# Patient Record
Sex: MALE | Race: WHITE | NOT HISPANIC OR LATINO
[De-identification: names, ages, dates, MRNs, and addresses within clinical notes are randomized per-mention and may not be internally consistent; named-entity substitution may affect disease eponyms.]

---

## 2017-11-22 ENCOUNTER — APPOINTMENT (OUTPATIENT)
Dept: FAMILY MEDICINE | Facility: CLINIC | Age: 67
End: 2017-11-22
Payer: MEDICARE

## 2017-11-22 VITALS
DIASTOLIC BLOOD PRESSURE: 84 MMHG | SYSTOLIC BLOOD PRESSURE: 112 MMHG | WEIGHT: 225 LBS | HEART RATE: 87 BPM | RESPIRATION RATE: 16 BRPM | OXYGEN SATURATION: 96 % | HEIGHT: 69 IN | BODY MASS INDEX: 33.33 KG/M2

## 2017-11-22 DIAGNOSIS — Z78.9 OTHER SPECIFIED HEALTH STATUS: ICD-10-CM

## 2017-11-22 DIAGNOSIS — Z82.49 FAMILY HISTORY OF ISCHEMIC HEART DISEASE AND OTHER DISEASES OF THE CIRCULATORY SYSTEM: ICD-10-CM

## 2017-11-22 PROCEDURE — 99213 OFFICE O/P EST LOW 20 MIN: CPT

## 2017-11-22 RX ORDER — BENZONATATE 100 MG/1
100 CAPSULE ORAL
Qty: 180 | Refills: 0 | Status: DISCONTINUED | COMMUNITY
Start: 2017-10-20 | End: 2017-11-22

## 2017-11-22 RX ORDER — OLOPATADINE HCL 1 MG/ML
0.1 SOLUTION/ DROPS OPHTHALMIC
Qty: 5 | Refills: 0 | Status: DISCONTINUED | COMMUNITY
Start: 2017-05-26 | End: 2017-11-22

## 2017-11-22 RX ORDER — CEFDINIR 300 MG/1
300 CAPSULE ORAL
Qty: 20 | Refills: 0 | Status: DISCONTINUED | COMMUNITY
Start: 2017-07-31 | End: 2017-11-22

## 2017-11-22 RX ORDER — OMEPRAZOLE 20 MG/1
20 CAPSULE, DELAYED RELEASE ORAL
Qty: 90 | Refills: 0 | Status: DISCONTINUED | COMMUNITY
Start: 2017-09-19 | End: 2017-11-22

## 2017-11-22 RX ORDER — PREDNISONE 5 MG/1
5 TABLET ORAL
Qty: 240 | Refills: 0 | Status: DISCONTINUED | COMMUNITY
Start: 2017-10-03 | End: 2017-11-22

## 2017-11-22 RX ORDER — LEVOFLOXACIN 500 MG/1
500 TABLET, FILM COATED ORAL
Qty: 10 | Refills: 0 | Status: DISCONTINUED | COMMUNITY
Start: 2017-08-29 | End: 2017-11-22

## 2018-01-11 ENCOUNTER — RX RENEWAL (OUTPATIENT)
Age: 68
End: 2018-01-11

## 2018-02-14 ENCOUNTER — APPOINTMENT (OUTPATIENT)
Dept: FAMILY MEDICINE | Facility: CLINIC | Age: 68
End: 2018-02-14
Payer: MEDICARE

## 2018-02-14 VITALS
WEIGHT: 225 LBS | BODY MASS INDEX: 33.33 KG/M2 | SYSTOLIC BLOOD PRESSURE: 122 MMHG | OXYGEN SATURATION: 96 % | HEIGHT: 69 IN | DIASTOLIC BLOOD PRESSURE: 64 MMHG | RESPIRATION RATE: 16 BRPM | HEART RATE: 70 BPM

## 2018-02-14 DIAGNOSIS — M17.10 UNILATERAL PRIMARY OSTEOARTHRITIS, UNSPECIFIED KNEE: ICD-10-CM

## 2018-02-14 PROCEDURE — 99213 OFFICE O/P EST LOW 20 MIN: CPT

## 2018-03-26 ENCOUNTER — MEDICATION RENEWAL (OUTPATIENT)
Age: 68
End: 2018-03-26

## 2018-04-11 ENCOUNTER — RX RENEWAL (OUTPATIENT)
Age: 68
End: 2018-04-11

## 2018-06-20 ENCOUNTER — RX RENEWAL (OUTPATIENT)
Age: 68
End: 2018-06-20

## 2018-06-20 DIAGNOSIS — K21.9 GASTRO-ESOPHAGEAL REFLUX DISEASE W/OUT ESOPHAGITIS: ICD-10-CM

## 2018-07-20 ENCOUNTER — RX RENEWAL (OUTPATIENT)
Age: 68
End: 2018-07-20

## 2018-07-30 ENCOUNTER — APPOINTMENT (OUTPATIENT)
Dept: FAMILY MEDICINE | Facility: CLINIC | Age: 68
End: 2018-07-30
Payer: MEDICARE

## 2018-07-30 VITALS
SYSTOLIC BLOOD PRESSURE: 140 MMHG | OXYGEN SATURATION: 90 % | RESPIRATION RATE: 16 BRPM | HEART RATE: 104 BPM | TEMPERATURE: 98.7 F | DIASTOLIC BLOOD PRESSURE: 90 MMHG

## 2018-07-30 PROCEDURE — 99213 OFFICE O/P EST LOW 20 MIN: CPT

## 2018-07-30 NOTE — REVIEW OF SYSTEMS
[Fever] : fever [Shortness Of Breath] : shortness of breath [Cough] : cough [Dyspnea on Exertion] : dyspnea on exertion

## 2018-07-30 NOTE — ASSESSMENT
[FreeTextEntry1] : Long discussion with pt and his wife that he needs to go to the ER - he prefers [facility]. States he will go.

## 2018-07-30 NOTE — HISTORY OF PRESENT ILLNESS
[FreeTextEntry8] : pt c/o temp +cough +chills +body aches +ha -sinus pressure +congestion x 2 days  Has SOB.

## 2018-07-30 NOTE — PHYSICAL EXAM
[Ill-Appearing] : ill-appearing [Normal Outer Ear/Nose] : the outer ears and nose were normal in appearance [Normal Oropharynx] : the oropharynx was normal [Normal TMs] : both tympanic membranes were normal [No JVD] : no jugular venous distention [Supple] : supple [No Lymphadenopathy] : no lymphadenopathy [Regular Rhythm] : with a regular rhythm [No Carotid Bruits] : no carotid bruits [Normal Posterior Cervical Nodes] : no posterior cervical lymphadenopathy [Normal Anterior Cervical Nodes] : no anterior cervical lymphadenopathy [de-identified] : in mild/moderate respiratory distress [de-identified] : diffuse rhonchi in all lung fields, fair to poor ventilation [de-identified] : HR-104/min

## 2018-09-06 ENCOUNTER — APPOINTMENT (OUTPATIENT)
Dept: FAMILY MEDICINE | Facility: CLINIC | Age: 68
End: 2018-09-06
Payer: MEDICARE

## 2018-09-06 VITALS
HEART RATE: 64 BPM | WEIGHT: 225 LBS | BODY MASS INDEX: 33.23 KG/M2 | DIASTOLIC BLOOD PRESSURE: 62 MMHG | OXYGEN SATURATION: 92 % | SYSTOLIC BLOOD PRESSURE: 110 MMHG | RESPIRATION RATE: 16 BRPM

## 2018-09-06 DIAGNOSIS — R53.83 OTHER FATIGUE: ICD-10-CM

## 2018-09-06 PROCEDURE — 99213 OFFICE O/P EST LOW 20 MIN: CPT | Mod: 25

## 2018-09-06 PROCEDURE — 90670 PCV13 VACCINE IM: CPT

## 2018-09-06 PROCEDURE — G0009: CPT

## 2018-09-06 RX ORDER — CLINDAMYCIN HYDROCHLORIDE 150 MG/1
150 CAPSULE ORAL
Qty: 21 | Refills: 0 | Status: DISCONTINUED | COMMUNITY
Start: 2018-07-26 | End: 2018-09-06

## 2018-09-06 RX ORDER — FLUCONAZOLE 200 MG/1
200 TABLET ORAL
Qty: 14 | Refills: 0 | Status: DISCONTINUED | COMMUNITY
Start: 2018-01-03 | End: 2018-09-06

## 2018-09-06 RX ORDER — HYDROCODONE BITARTRATE AND HOMATROPINE METHYLBROMIDE 5; 1.5 MG/5ML; MG/5ML
5-1.5 SYRUP ORAL
Qty: 210 | Refills: 0 | Status: DISCONTINUED | COMMUNITY
Start: 2017-11-27 | End: 2018-09-06

## 2018-09-06 RX ORDER — HYDROCODONE BITARTRATE AND ACETAMINOPHEN 5; 325 MG/1; MG/1
5-325 TABLET ORAL
Qty: 12 | Refills: 0 | Status: DISCONTINUED | COMMUNITY
Start: 2018-07-26 | End: 2018-09-06

## 2018-09-06 NOTE — REVIEW OF SYSTEMS
[Cough] : cough [Joint Pain] : joint pain [Joint Stiffness] : joint stiffness [Negative] : Heme/Lymph

## 2018-09-06 NOTE — HISTORY OF PRESENT ILLNESS
[FreeTextEntry1] : Pt is here for medication renewal.  [de-identified] : Pt here for Management of HTN, hyoperlipidemia and needs prevnar 13.

## 2018-09-06 NOTE — ASSESSMENT
[FreeTextEntry1] : 1. BP very well controlled- 110/62 and I renewed his quinapril 20 mg. Exercise reviewed. 2. Diet and medications use reviewed. As not fasting lab rx given. 3. Prevnar 13 given.

## 2018-10-07 LAB
CHOLEST SERPL-MCNC: 100
HBA1C MFR BLD HPLC: 6.1
HDLC SERPL-MCNC: 38
LDLC SERPL CALC-MCNC: 48
PSA SERPL-MCNC: 0.6
TRIGL SERPL-MCNC: 69

## 2018-10-09 ENCOUNTER — RX RENEWAL (OUTPATIENT)
Age: 68
End: 2018-10-09

## 2018-12-05 ENCOUNTER — APPOINTMENT (OUTPATIENT)
Dept: FAMILY MEDICINE | Facility: CLINIC | Age: 68
End: 2018-12-05
Payer: MEDICARE

## 2018-12-05 VITALS
HEIGHT: 69 IN | RESPIRATION RATE: 16 BRPM | WEIGHT: 225 LBS | OXYGEN SATURATION: 96 % | HEART RATE: 70 BPM | DIASTOLIC BLOOD PRESSURE: 68 MMHG | BODY MASS INDEX: 33.33 KG/M2 | SYSTOLIC BLOOD PRESSURE: 114 MMHG

## 2018-12-05 DIAGNOSIS — N52.9 MALE ERECTILE DYSFUNCTION, UNSPECIFIED: ICD-10-CM

## 2018-12-05 PROCEDURE — 99213 OFFICE O/P EST LOW 20 MIN: CPT | Mod: 25

## 2018-12-05 PROCEDURE — 90662 IIV NO PRSV INCREASED AG IM: CPT

## 2018-12-05 PROCEDURE — G0008: CPT

## 2018-12-05 RX ORDER — SILDENAFIL CITRATE 100 MG/1
100 TABLET, FILM COATED ORAL
Qty: 6 | Refills: 0 | Status: DISCONTINUED | COMMUNITY
Start: 2017-07-26 | End: 2018-12-05

## 2018-12-05 RX ORDER — PREDNISONE 20 MG/1
20 TABLET ORAL
Qty: 4 | Refills: 0 | Status: DISCONTINUED | COMMUNITY
Start: 2018-07-26 | End: 2018-12-05

## 2018-12-05 RX ORDER — PREDNISONE 10 MG/1
10 TABLET ORAL
Qty: 30 | Refills: 0 | Status: DISCONTINUED | COMMUNITY
Start: 2018-01-29 | End: 2018-12-05

## 2018-12-05 NOTE — REVIEW OF SYSTEMS
[Dyspnea on Exertion] : dyspnea on exertion [Joint Pain] : joint pain [Joint Stiffness] : joint stiffness [Negative] : Heme/Lymph

## 2018-12-05 NOTE — ASSESSMENT
[FreeTextEntry1] : 1. HTN - BP is very well controlled - 114/68 - on treatment and medications are all UTD. 2. Diet and use of  Rosuva statin were reviewed   Lab rx as not fasting.  3. HD flu shot given.

## 2018-12-05 NOTE — HISTORY OF PRESENT ILLNESS
[FreeTextEntry1] : Pt presents for 3 month follow up [de-identified] : Here for management of HTN and hyperlipidemia. He just had a CT of chest for Pul- Dr Grewal.

## 2018-12-05 NOTE — PHYSICAL EXAM
[No Acute Distress] : no acute distress [Well Nourished] : well nourished [Well Developed] : well developed [Well-Appearing] : well-appearing [No JVD] : no jugular venous distention [Supple] : supple [No Lymphadenopathy] : no lymphadenopathy [No Respiratory Distress] : no respiratory distress  [Normal Rate] : normal rate  [Regular Rhythm] : with a regular rhythm [No Murmur] : no murmur heard [No Carotid Bruits] : no carotid bruits [Normal Posterior Cervical Nodes] : no posterior cervical lymphadenopathy [Normal Anterior Cervical Nodes] : no anterior cervical lymphadenopathy [de-identified] : coarse BS [de-identified] : left knee tender, pain with ROM

## 2018-12-12 LAB
CHOLEST SERPL-MCNC: 97
HBA1C MFR BLD HPLC: 6.1
HDLC SERPL-MCNC: 39
LDLC SERPL CALC-MCNC: 42
TRIGL SERPL-MCNC: 78

## 2018-12-19 ENCOUNTER — RX RENEWAL (OUTPATIENT)
Age: 68
End: 2018-12-19

## 2019-01-22 ENCOUNTER — RX RENEWAL (OUTPATIENT)
Age: 69
End: 2019-01-22

## 2019-03-01 ENCOUNTER — APPOINTMENT (OUTPATIENT)
Dept: FAMILY MEDICINE | Facility: CLINIC | Age: 69
End: 2019-03-01
Payer: MEDICARE

## 2019-03-01 VITALS — DIASTOLIC BLOOD PRESSURE: 82 MMHG | SYSTOLIC BLOOD PRESSURE: 130 MMHG

## 2019-03-01 VITALS
BODY MASS INDEX: 33.33 KG/M2 | OXYGEN SATURATION: 95 % | HEIGHT: 69 IN | HEART RATE: 71 BPM | SYSTOLIC BLOOD PRESSURE: 132 MMHG | RESPIRATION RATE: 16 BRPM | DIASTOLIC BLOOD PRESSURE: 86 MMHG | WEIGHT: 225 LBS

## 2019-03-01 DIAGNOSIS — Z00.00 ENCOUNTER FOR GENERAL ADULT MEDICAL EXAMINATION W/OUT ABNORMAL FINDINGS: ICD-10-CM

## 2019-03-01 PROCEDURE — G0439: CPT

## 2019-03-01 NOTE — ASSESSMENT
[FreeTextEntry1] : Diet/exercise - both reviewed and stressed as well as losing wt.  Fall precautions discussed.  Singulair  was renewed and all allergy  meds were reviewed.  Lab due in 3 months here.  vaccinations UTD as far as what he will do.

## 2019-03-01 NOTE — HEALTH RISK ASSESSMENT
[Good] : ~his/her~  mood as  good [30 or more] : 30 or more [No falls in past year] : Patient reported no falls in the past year [0] : 2) Feeling down, depressed, or hopeless: Not at all (0) [HIV test declined] : HIV test declined [Hepatitis C test declined] : Hepatitis C test declined [None] : None [With Significant Other] : lives with significant other [Retired] : retired [] :  [# Of Children ___] : has [unfilled] children [Feels Safe at Home] : Feels safe at home [Fully functional (bathing, dressing, toileting, transferring, walking, feeding)] : Fully functional (bathing, dressing, toileting, transferring, walking, feeding) [Fully functional (using the telephone, shopping, preparing meals, housekeeping, doing laundry, using] : Fully functional and needs no help or supervision to perform IADLs (using the telephone, shopping, preparing meals, housekeeping, doing laundry, using transportation, managing medications and managing finances) [Reports changes in hearing] : Reports changes in hearing [Reports changes in vision] : Reports changes in vision [Reports normal functional visual acuity (ie: able to read med bottle)] : Reports normal functional visual acuity [Smoke Detector] : smoke detector [Carbon Monoxide Detector] : carbon monoxide detector [Guns at Home] : guns at home [Safety elements used in home] : safety elements used in home [Seat Belt] :  uses seat belt [Sunscreen] : uses sunscreen [Designated Healthcare Proxy] : Designated healthcare proxy [Name: ___] : Health Care Proxy's Name: [unfilled]  [Relationship: ___] : Relationship: [unfilled] [] : No [de-identified] : social [de-identified] : no regular exercise [de-identified] : regular diet [[id number]] : 0 [LowDoseCTScan] : 12/4/18 [Change in mental status noted] : No change in mental status noted [Language] : denies difficulty with language [Behavior] : denies difficulty with behavior [Learning/Retaining New Information] : denies difficulty learning/retaining new information [Handling Complex Tasks] : denies difficulty handling complex tasks [Reasoning] : denies difficulty with reasoning [Spatial Ability and Orientation] : denies difficulty with spatial ability and orientation [Reports changes in dental health] : Reports no changes in dental health [Travel to Developing Areas] : does not  travel to developing areas [TB Exposure] : is not being exposed to tuberculosis [Caregiver Concerns] : does not have caregiver concerns [ColonoscopyDate] : 2013 [AdvancecareDate] : 3/1/2019

## 2019-03-01 NOTE — REVIEW OF SYSTEMS
[Postnasal Drip] : postnasal drip [Nasal Discharge] : nasal discharge [Joint Pain] : joint pain [Joint Stiffness] : joint stiffness [Negative] : Heme/Lymph

## 2019-03-01 NOTE — PHYSICAL EXAM
[No Acute Distress] : no acute distress [Well-Appearing] : well-appearing [Normal Outer Ear/Nose] : the outer ears and nose were normal in appearance [Normal Oropharynx] : the oropharynx was normal [Normal TMs] : both tympanic membranes were normal [No JVD] : no jugular venous distention [Supple] : supple [No Lymphadenopathy] : no lymphadenopathy [No Respiratory Distress] : no respiratory distress  [Clear to Auscultation] : lungs were clear to auscultation bilaterally [Normal Rate] : normal rate  [Regular Rhythm] : with a regular rhythm [No Murmur] : no murmur heard [No Carotid Bruits] : no carotid bruits [Normal Posterior Cervical Nodes] : no posterior cervical lymphadenopathy [Normal Anterior Cervical Nodes] : no anterior cervical lymphadenopathy [No CVA Tenderness] : no CVA  tenderness [No Spinal Tenderness] : no spinal tenderness [Normal Gait] : normal gait [Coordination Grossly Intact] : coordination grossly intact [No Focal Deficits] : no focal deficits [Speech Grossly Normal] : speech grossly normal [Memory Grossly Normal] : memory grossly normal [Normal Affect] : the affect was normal [Alert and Oriented x3] : oriented to person, place, and time [Normal Mood] : the mood was normal [Normal Insight/Judgement] : insight and judgment were intact [de-identified] : overweight [de-identified] : watery PND

## 2019-04-18 ENCOUNTER — RX RENEWAL (OUTPATIENT)
Age: 69
End: 2019-04-18

## 2019-04-24 ENCOUNTER — APPOINTMENT (OUTPATIENT)
Dept: FAMILY MEDICINE | Facility: CLINIC | Age: 69
End: 2019-04-24
Payer: MEDICARE

## 2019-04-24 VITALS
RESPIRATION RATE: 14 BRPM | OXYGEN SATURATION: 93 % | DIASTOLIC BLOOD PRESSURE: 78 MMHG | HEART RATE: 75 BPM | SYSTOLIC BLOOD PRESSURE: 110 MMHG | HEIGHT: 69 IN | BODY MASS INDEX: 33.33 KG/M2 | WEIGHT: 225 LBS

## 2019-04-24 DIAGNOSIS — M48.061 SPINAL STENOSIS, LUMBAR REGION WITHOUT NEUROGENIC CLAUDICATION: ICD-10-CM

## 2019-04-24 PROCEDURE — 99213 OFFICE O/P EST LOW 20 MIN: CPT

## 2019-04-24 NOTE — ASSESSMENT
[FreeTextEntry1] : Instructed to use heat, do stretches, salonpas pain patch prn\par Take diclofenac and steroid pack with food, may take tylenol 1000mg prn - stop ibuprofen\par Instructed to get back brace for support\par MRI ordered\par PT offered - pt refused\par

## 2019-04-24 NOTE — PHYSICAL EXAM
[No Acute Distress] : no acute distress [Well Nourished] : well nourished [Well Developed] : well developed [Well-Appearing] : well-appearing [Normal Sclera/Conjunctiva] : normal sclera/conjunctiva [Normal Outer Ear/Nose] : the outer ears and nose were normal in appearance [No Respiratory Distress] : no respiratory distress  [Clear to Auscultation] : lungs were clear to auscultation bilaterally [Normal Rate] : normal rate  [No Accessory Muscle Use] : no accessory muscle use [Regular Rhythm] : with a regular rhythm [Normal S1, S2] : normal S1 and S2 [No Murmur] : no murmur heard [No Edema] : there was no peripheral edema [No Spinal Tenderness] : no spinal tenderness [No Joint Swelling] : no joint swelling [Grossly Normal Strength/Tone] : grossly normal strength/tone [Normal Gait] : normal gait [Coordination Grossly Intact] : coordination grossly intact [No Focal Deficits] : no focal deficits [Normal Affect] : the affect was normal [Alert and Oriented x3] : oriented to person, place, and time [Normal Insight/Judgement] : insight and judgment were intact [de-identified] : bilateral SLR negative. Full ROM. Pain noted with active ROM.

## 2019-04-24 NOTE — HISTORY OF PRESENT ILLNESS
[FreeTextEntry8] : Patient c/o worsening right sided back pain x 3 months. Patient states pain radiates to right buttock. Pain is 7/10, aching in nature, worse with activity such as walking and standing, better with rest. He states pain is excruciating even when he goes grocery shopping and is walking down a few aisles, he las to lean on the cart for support. He reports he has been trying inversion with some relief, ibuprofen 800mg with some slight relief. States he had an MRI a few years ago and was diagnosed with spinal stenosis, he was advised to do surgery but pt did not want surgery at that time. Denies any numbness, tingling, weakness in extremities, saddle anesthesia, swelling, trauma or injury to the area.

## 2019-05-29 ENCOUNTER — RX RENEWAL (OUTPATIENT)
Age: 69
End: 2019-05-29

## 2019-07-08 ENCOUNTER — TRANSCRIPTION ENCOUNTER (OUTPATIENT)
Age: 69
End: 2019-07-08

## 2019-07-20 ENCOUNTER — RX RENEWAL (OUTPATIENT)
Age: 69
End: 2019-07-20

## 2019-07-24 ENCOUNTER — RX RENEWAL (OUTPATIENT)
Age: 69
End: 2019-07-24

## 2019-08-14 ENCOUNTER — RX RENEWAL (OUTPATIENT)
Age: 69
End: 2019-08-14

## 2019-08-26 ENCOUNTER — RX RENEWAL (OUTPATIENT)
Age: 69
End: 2019-08-26

## 2019-09-17 ENCOUNTER — RX RENEWAL (OUTPATIENT)
Age: 69
End: 2019-09-17

## 2019-10-16 ENCOUNTER — RX RENEWAL (OUTPATIENT)
Age: 69
End: 2019-10-16

## 2019-11-25 ENCOUNTER — RX RENEWAL (OUTPATIENT)
Age: 69
End: 2019-11-25

## 2019-11-27 ENCOUNTER — RX RENEWAL (OUTPATIENT)
Age: 69
End: 2019-11-27

## 2019-12-04 ENCOUNTER — MED ADMIN CHARGE (OUTPATIENT)
Age: 69
End: 2019-12-04

## 2019-12-04 ENCOUNTER — APPOINTMENT (OUTPATIENT)
Dept: FAMILY MEDICINE | Facility: CLINIC | Age: 69
End: 2019-12-04
Payer: MEDICARE

## 2019-12-04 VITALS
RESPIRATION RATE: 14 BRPM | HEART RATE: 61 BPM | DIASTOLIC BLOOD PRESSURE: 68 MMHG | OXYGEN SATURATION: 97 % | SYSTOLIC BLOOD PRESSURE: 124 MMHG

## 2019-12-04 PROCEDURE — G0008: CPT

## 2019-12-04 PROCEDURE — 90674 CCIIV4 VAC NO PRSV 0.5 ML IM: CPT

## 2019-12-17 ENCOUNTER — APPOINTMENT (OUTPATIENT)
Dept: FAMILY MEDICINE | Facility: CLINIC | Age: 69
End: 2019-12-17

## 2020-01-07 ENCOUNTER — RX RENEWAL (OUTPATIENT)
Age: 70
End: 2020-01-07

## 2020-01-20 ENCOUNTER — RX RENEWAL (OUTPATIENT)
Age: 70
End: 2020-01-20

## 2020-02-13 ENCOUNTER — RX RENEWAL (OUTPATIENT)
Age: 70
End: 2020-02-13

## 2020-02-24 ENCOUNTER — RX RENEWAL (OUTPATIENT)
Age: 70
End: 2020-02-24

## 2020-02-27 ENCOUNTER — RX RENEWAL (OUTPATIENT)
Age: 70
End: 2020-02-27

## 2020-05-22 ENCOUNTER — RX RENEWAL (OUTPATIENT)
Age: 70
End: 2020-05-22

## 2020-05-28 ENCOUNTER — APPOINTMENT (OUTPATIENT)
Dept: FAMILY MEDICINE | Facility: CLINIC | Age: 70
End: 2020-05-28
Payer: MEDICARE

## 2020-05-28 ENCOUNTER — RX RENEWAL (OUTPATIENT)
Age: 70
End: 2020-05-28

## 2020-05-28 VITALS
HEIGHT: 69 IN | BODY MASS INDEX: 33.77 KG/M2 | OXYGEN SATURATION: 95 % | WEIGHT: 228 LBS | HEART RATE: 71 BPM | TEMPERATURE: 98.2 F | DIASTOLIC BLOOD PRESSURE: 80 MMHG | RESPIRATION RATE: 14 BRPM | SYSTOLIC BLOOD PRESSURE: 132 MMHG

## 2020-05-28 DIAGNOSIS — D64.9 ANEMIA, UNSPECIFIED: ICD-10-CM

## 2020-05-28 PROCEDURE — 99213 OFFICE O/P EST LOW 20 MIN: CPT

## 2020-05-28 NOTE — ASSESSMENT
[FreeTextEntry1] : 1. He is doing well with his treatment - renewed singulair.  2.  HTN- BP is controlled- 132/80 -  exercise/ losing wt reviewed.  3.  Diet and use of rosuvastatin  discussed.  Lab rx as not fasting.  COVID19 precautions reviewed.

## 2020-06-18 LAB
CHOLEST SERPL-MCNC: 101
CHOLEST/HDLC SERPL: 3
HBA1C MFR BLD HPLC: 6.6
HDLC SERPL-MCNC: 35
LDLC SERPL CALC-MCNC: 57
PSA SERPL-MCNC: 0.6
TRIGL SERPL-MCNC: 81

## 2020-07-19 ENCOUNTER — RX RENEWAL (OUTPATIENT)
Age: 70
End: 2020-07-19

## 2020-08-13 ENCOUNTER — RX RENEWAL (OUTPATIENT)
Age: 70
End: 2020-08-13

## 2020-08-14 ENCOUNTER — RX RENEWAL (OUTPATIENT)
Age: 70
End: 2020-08-14

## 2020-09-25 ENCOUNTER — TRANSCRIPTION ENCOUNTER (OUTPATIENT)
Age: 70
End: 2020-09-25

## 2020-10-02 ENCOUNTER — APPOINTMENT (OUTPATIENT)
Dept: FAMILY MEDICINE | Facility: CLINIC | Age: 70
End: 2020-10-02
Payer: MEDICARE

## 2020-10-02 VITALS
HEIGHT: 69 IN | SYSTOLIC BLOOD PRESSURE: 104 MMHG | OXYGEN SATURATION: 95 % | TEMPERATURE: 97.6 F | WEIGHT: 225 LBS | BODY MASS INDEX: 33.33 KG/M2 | RESPIRATION RATE: 14 BRPM | HEART RATE: 67 BPM | DIASTOLIC BLOOD PRESSURE: 60 MMHG

## 2020-10-02 DIAGNOSIS — M54.41 LUMBAGO WITH SCIATICA, RIGHT SIDE: ICD-10-CM

## 2020-10-02 DIAGNOSIS — R74.8 ABNORMAL LEVELS OF OTHER SERUM ENZYMES: ICD-10-CM

## 2020-10-02 DIAGNOSIS — J06.9 ACUTE UPPER RESPIRATORY INFECTION, UNSPECIFIED: ICD-10-CM

## 2020-10-02 DIAGNOSIS — Z87.09 PERSONAL HISTORY OF OTHER DISEASES OF THE RESPIRATORY SYSTEM: ICD-10-CM

## 2020-10-02 DIAGNOSIS — R73.09 OTHER ABNORMAL GLUCOSE: ICD-10-CM

## 2020-10-02 DIAGNOSIS — R94.6 ABNORMAL RESULTS OF THYROID FUNCTION STUDIES: ICD-10-CM

## 2020-10-02 PROCEDURE — 99214 OFFICE O/P EST MOD 30 MIN: CPT

## 2020-10-02 RX ORDER — DICLOFENAC SODIUM 75 MG/1
75 TABLET, DELAYED RELEASE ORAL
Qty: 60 | Refills: 1 | Status: DISCONTINUED | COMMUNITY
Start: 2019-04-24 | End: 2020-10-02

## 2020-10-02 RX ORDER — METHYLPREDNISOLONE 4 MG/1
4 TABLET ORAL
Qty: 1 | Refills: 0 | Status: DISCONTINUED | COMMUNITY
Start: 2019-04-24 | End: 2020-10-02

## 2020-10-02 NOTE — PHYSICAL EXAM
[Normal] : no jugular venous distention, supple, no lymphadenopathy and the thyroid was normal and there were no nodules present [Scattered Wheezes] : scattered wheezing was heard [Rhonchi Bilateral] : rhonchi were heard diffusely over both lungs [RUQ] : in the right upper quadrant [RLQ] : in the right lower quadrant [LUQ] : in the left upper quadrant [LLQ] : in the left lower quadrant [Rebound] : no rebound [Guarding] : guarding

## 2020-10-02 NOTE — HISTORY OF PRESENT ILLNESS
[FreeTextEntry8] : pt c/o lower abdomen pain X 2 days \par \par a few days ago he had lower abdominal pain . it is better today. no diarrhea nausea vomiting constipation no rectal bleeding no swelling. He has never had this pain before. he had virtual colonscopy  2013. \par no sob no cp\par  he was at urgent care for sinus coughand was prescribed prednisone 20 gm for 5 days

## 2020-10-02 NOTE — ASSESSMENT
[FreeTextEntry1] : cxr stat\par Take antibiotics,  rest,  increase fluids, wash hands to prevent the spread of  infection . Take mucinex   over the counter. Take tylenol or advil for fever or body aches. Follow up if no better or worse respiratory symptoms.\par ct abd and pelvis with iv po contrast ro ap and colitis\par go to er if worse pain or fever \par suggest covid swab

## 2020-10-19 ENCOUNTER — MED ADMIN CHARGE (OUTPATIENT)
Age: 70
End: 2020-10-19

## 2020-10-19 ENCOUNTER — APPOINTMENT (OUTPATIENT)
Dept: FAMILY MEDICINE | Facility: CLINIC | Age: 70
End: 2020-10-19
Payer: MEDICARE

## 2020-10-19 PROCEDURE — 90662 IIV NO PRSV INCREASED AG IM: CPT

## 2020-10-19 PROCEDURE — G0008: CPT

## 2020-10-20 ENCOUNTER — APPOINTMENT (OUTPATIENT)
Dept: FAMILY MEDICINE | Facility: CLINIC | Age: 70
End: 2020-10-20

## 2020-11-06 ENCOUNTER — RX RENEWAL (OUTPATIENT)
Age: 70
End: 2020-11-06

## 2020-12-03 ENCOUNTER — APPOINTMENT (OUTPATIENT)
Dept: FAMILY MEDICINE | Facility: CLINIC | Age: 70
End: 2020-12-03
Payer: MEDICARE

## 2020-12-03 ENCOUNTER — LABORATORY RESULT (OUTPATIENT)
Age: 70
End: 2020-12-03

## 2020-12-03 VITALS
OXYGEN SATURATION: 96 % | BODY MASS INDEX: 33.33 KG/M2 | TEMPERATURE: 96.6 F | RESPIRATION RATE: 14 BRPM | HEIGHT: 69 IN | SYSTOLIC BLOOD PRESSURE: 110 MMHG | HEART RATE: 68 BPM | WEIGHT: 225 LBS | DIASTOLIC BLOOD PRESSURE: 58 MMHG

## 2020-12-03 VITALS — DIASTOLIC BLOOD PRESSURE: 70 MMHG | SYSTOLIC BLOOD PRESSURE: 124 MMHG

## 2020-12-03 DIAGNOSIS — Z85.51 PERSONAL HISTORY OF MALIGNANT NEOPLASM OF BLADDER: ICD-10-CM

## 2020-12-03 DIAGNOSIS — Z87.09 PERSONAL HISTORY OF OTHER DISEASES OF THE RESPIRATORY SYSTEM: ICD-10-CM

## 2020-12-03 DIAGNOSIS — Z87.01 PERSONAL HISTORY OF PNEUMONIA (RECURRENT): ICD-10-CM

## 2020-12-03 DIAGNOSIS — Z87.891 PERSONAL HISTORY OF NICOTINE DEPENDENCE: ICD-10-CM

## 2020-12-03 PROCEDURE — 99214 OFFICE O/P EST MOD 30 MIN: CPT | Mod: 25

## 2020-12-03 PROCEDURE — 90732 PPSV23 VACC 2 YRS+ SUBQ/IM: CPT

## 2020-12-03 PROCEDURE — G0009: CPT

## 2020-12-03 PROCEDURE — 36415 COLL VENOUS BLD VENIPUNCTURE: CPT

## 2020-12-03 RX ORDER — FLUTICASONE PROPIONATE 50 UG/1
50 SPRAY, METERED NASAL
Qty: 16 | Refills: 0 | Status: DISCONTINUED | COMMUNITY
Start: 2018-02-12 | End: 2020-12-03

## 2020-12-03 RX ORDER — BUDESONIDE 0.5 MG/2ML
0.5 INHALANT ORAL
Qty: 120 | Refills: 0 | Status: DISCONTINUED | COMMUNITY
Start: 2018-03-09 | End: 2020-12-03

## 2020-12-03 RX ORDER — LORAZEPAM 1 MG/1
1 TABLET ORAL
Qty: 1 | Refills: 0 | Status: DISCONTINUED | COMMUNITY
Start: 2018-07-26 | End: 2020-12-03

## 2020-12-03 RX ORDER — AZELASTINE HYDROCHLORIDE 137 UG/1
0.1 SPRAY, METERED NASAL
Qty: 30 | Refills: 0 | Status: DISCONTINUED | COMMUNITY
Start: 2018-04-09 | End: 2020-12-03

## 2020-12-03 RX ORDER — SILDENAFIL 100 MG/1
100 TABLET, FILM COATED ORAL
Qty: 6 | Refills: 10 | Status: DISCONTINUED | COMMUNITY
Start: 2018-12-05 | End: 2020-12-03

## 2020-12-03 RX ORDER — AZITHROMYCIN 500 MG/1
500 TABLET, FILM COATED ORAL DAILY
Qty: 5 | Refills: 0 | Status: DISCONTINUED | COMMUNITY
Start: 2020-10-02 | End: 2020-12-03

## 2020-12-03 RX ORDER — IPRATROPIUM BROMIDE 0.5 MG/2.5ML
0.02 SOLUTION RESPIRATORY (INHALATION)
Qty: 75 | Refills: 0 | Status: DISCONTINUED | COMMUNITY
Start: 2018-03-19 | End: 2020-12-03

## 2020-12-03 RX ORDER — ALBUTEROL SULFATE 2.5 MG/3ML
(2.5 MG/3ML) SOLUTION RESPIRATORY (INHALATION)
Qty: 300 | Refills: 0 | Status: DISCONTINUED | COMMUNITY
Start: 2018-03-09 | End: 2020-12-03

## 2020-12-03 NOTE — HISTORY OF PRESENT ILLNESS
[FreeTextEntry1] : Patient present for medication renewal\par  [de-identified] : 69yo M presents for f/u for HTN, HLD\par \par Denies chest pain, palpitations, shortness of breath, Headaches, vision changes or LE edema, fevers, chills or coughing \par denies muscle pain on statin\par \par he said he has chronic right buttock pain since his aortic aneurysm repair early this yr\par he said it is improving and just occurs if he is walking at times

## 2020-12-03 NOTE — PLAN
[FreeTextEntry1] : \par HTN stable\par continue current regimen\par cmp\par \par HLD \par denies muscle pain\par cmp\par \par COPD\par trial of albuterol and symbicort as he is not on med and doesn’t see pulmonary\par \par GERD\par trial of famotidine instead of ppi given possibility of long term side effects\par \par labs drawn in office and will be sent to [facility] core lab\par \par chronic sciatica pain\par heating pad prn\par tylenol prn\par nsaids sparingly only as can raise bp\par \par \par pnuemovax 23 vaccine given left arm IM\par no adverse reactions noted, consent obtained\par \par f/u 3 mos or sooner if issues arise pt agreed w/plan

## 2020-12-03 NOTE — PHYSICAL EXAM
[No Lymphadenopathy] : no lymphadenopathy [Supple] : supple [Normal] : normal rate, regular rhythm, normal S1 and S2 and no murmur heard [No Edema] : there was no peripheral edema [No Focal Deficits] : no focal deficits [Normal Affect] : the affect was normal [Normal Mood] : the mood was normal [Normal Insight/Judgement] : insight and judgment were intact [de-identified] : Full ROM of back b/l, no pain w/ flexion, extension or lateral rotation, , no pain on palpation of spine or paraspinal muscles b/l

## 2020-12-05 LAB
ALBUMIN SERPL ELPH-MCNC: 4.2 G/DL
ALP BLD-CCNC: 76 U/L
ALT SERPL-CCNC: 20 U/L
ANION GAP SERPL CALC-SCNC: 9 MMOL/L
APPEARANCE: CLEAR
AST SERPL-CCNC: 13 U/L
BASOPHILS # BLD AUTO: 0.08 K/UL
BASOPHILS NFR BLD AUTO: 0.9 %
BILIRUB SERPL-MCNC: 0.5 MG/DL
BILIRUBIN URINE: NEGATIVE
BLOOD URINE: NEGATIVE
BUN SERPL-MCNC: 20 MG/DL
CALCIUM SERPL-MCNC: 9.9 MG/DL
CHLORIDE SERPL-SCNC: 105 MMOL/L
CO2 SERPL-SCNC: 26 MMOL/L
COLOR: YELLOW
CREAT SERPL-MCNC: 0.8 MG/DL
CREAT SPEC-SCNC: 133 MG/DL
EOSINOPHIL # BLD AUTO: 0.18 K/UL
EOSINOPHIL NFR BLD AUTO: 2.1 %
ESTIMATED AVERAGE GLUCOSE: 137 MG/DL
GLUCOSE QUALITATIVE U: NEGATIVE
GLUCOSE SERPL-MCNC: 128 MG/DL
HBA1C MFR BLD HPLC: 6.4 %
HCT VFR BLD CALC: 47.6 %
HGB BLD-MCNC: 14.8 G/DL
IMM GRANULOCYTES NFR BLD AUTO: 0.1 %
KETONES URINE: NEGATIVE
LEUKOCYTE ESTERASE URINE: NEGATIVE
LYMPHOCYTES # BLD AUTO: 2.04 K/UL
LYMPHOCYTES NFR BLD AUTO: 23.6 %
MAN DIFF?: NORMAL
MCHC RBC-ENTMCNC: 30.3 PG
MCHC RBC-ENTMCNC: 31.1 GM/DL
MCV RBC AUTO: 97.3 FL
MICROALBUMIN 24H UR DL<=1MG/L-MCNC: 21.6 MG/DL
MICROALBUMIN/CREAT 24H UR-RTO: 162 MG/G
MONOCYTES # BLD AUTO: 0.95 K/UL
MONOCYTES NFR BLD AUTO: 11 %
NEUTROPHILS # BLD AUTO: 5.4 K/UL
NEUTROPHILS NFR BLD AUTO: 62.3 %
NITRITE URINE: NEGATIVE
PH URINE: 6
PLATELET # BLD AUTO: 279 K/UL
POTASSIUM SERPL-SCNC: 4.6 MMOL/L
PROT SERPL-MCNC: 7.5 G/DL
PROTEIN URINE: ABNORMAL
RBC # BLD: 4.89 M/UL
RBC # FLD: 14.1 %
SODIUM SERPL-SCNC: 140 MMOL/L
SPECIFIC GRAVITY URINE: 1.02
UROBILINOGEN URINE: NORMAL
WBC # FLD AUTO: 8.66 K/UL

## 2020-12-23 PROBLEM — Z87.09 HISTORY OF ACUTE BRONCHITIS: Status: RESOLVED | Noted: 2020-10-02 | Resolved: 2020-12-23

## 2021-02-26 ENCOUNTER — RX RENEWAL (OUTPATIENT)
Age: 71
End: 2021-02-26

## 2021-03-23 ENCOUNTER — RX RENEWAL (OUTPATIENT)
Age: 71
End: 2021-03-23

## 2021-04-09 ENCOUNTER — RX RENEWAL (OUTPATIENT)
Age: 71
End: 2021-04-09

## 2021-04-16 ENCOUNTER — RX RENEWAL (OUTPATIENT)
Age: 71
End: 2021-04-16

## 2021-07-09 ENCOUNTER — RX RENEWAL (OUTPATIENT)
Age: 71
End: 2021-07-09

## 2021-07-26 ENCOUNTER — APPOINTMENT (OUTPATIENT)
Dept: FAMILY MEDICINE | Facility: CLINIC | Age: 71
End: 2021-07-26
Payer: MEDICARE

## 2021-07-26 VITALS
BODY MASS INDEX: 32.44 KG/M2 | RESPIRATION RATE: 14 BRPM | HEART RATE: 76 BPM | TEMPERATURE: 97.9 F | WEIGHT: 219 LBS | OXYGEN SATURATION: 93 % | SYSTOLIC BLOOD PRESSURE: 126 MMHG | HEIGHT: 69 IN | DIASTOLIC BLOOD PRESSURE: 76 MMHG

## 2021-07-26 DIAGNOSIS — N40.0 BENIGN PROSTATIC HYPERPLASIA WITHOUT LOWER URINARY TRACT SYMPMS: ICD-10-CM

## 2021-07-26 PROCEDURE — G0444 DEPRESSION SCREEN ANNUAL: CPT | Mod: 59

## 2021-07-26 PROCEDURE — 99214 OFFICE O/P EST MOD 30 MIN: CPT | Mod: 25

## 2021-07-26 PROCEDURE — G0442 ANNUAL ALCOHOL SCREEN 15 MIN: CPT | Mod: 59

## 2021-07-26 RX ORDER — FAMOTIDINE 40 MG/1
40 TABLET, FILM COATED ORAL DAILY
Qty: 30 | Refills: 6 | Status: DISCONTINUED | COMMUNITY
Start: 2020-12-03 | End: 2021-07-26

## 2021-07-26 RX ORDER — MONTELUKAST 10 MG/1
10 TABLET, FILM COATED ORAL
Qty: 90 | Refills: 1 | Status: DISCONTINUED | COMMUNITY
Start: 2018-06-28 | End: 2021-07-26

## 2021-07-26 RX ORDER — GUAIFENESIN AND CODEINE PHOSPHATE 10; 100 MG/5ML; MG/5ML
100-10 SOLUTION ORAL
Qty: 240 | Refills: 0 | Status: DISCONTINUED | COMMUNITY
Start: 2021-06-16 | End: 2021-07-26

## 2021-07-26 RX ORDER — OFLOXACIN 3 MG/ML
0.3 SOLUTION/ DROPS OPHTHALMIC
Qty: 5 | Refills: 0 | Status: DISCONTINUED | COMMUNITY
Start: 2021-07-01 | End: 2021-07-26

## 2021-07-26 RX ORDER — CEFUROXIME AXETIL 500 MG/1
500 TABLET ORAL
Qty: 20 | Refills: 0 | Status: DISCONTINUED | COMMUNITY
Start: 2021-06-16 | End: 2021-07-26

## 2021-07-26 RX ORDER — CLINDAMYCIN HYDROCHLORIDE 300 MG/1
300 CAPSULE ORAL
Qty: 28 | Refills: 0 | Status: DISCONTINUED | COMMUNITY
Start: 2021-04-29 | End: 2021-07-26

## 2021-07-26 NOTE — PHYSICAL EXAM
[No Lymphadenopathy] : no lymphadenopathy [Supple] : supple [No Respiratory Distress] : no respiratory distress  [No Accessory Muscle Use] : no accessory muscle use [Normal] : normal rate, regular rhythm, normal S1 and S2 and no murmur heard [Pedal Pulses Present] : the pedal pulses are present [No Edema] : there was no peripheral edema [Soft] : abdomen soft [Non Tender] : non-tender [Non-distended] : non-distended [Normal Bowel Sounds] : normal bowel sounds [Coordination Grossly Intact] : coordination grossly intact [No Focal Deficits] : no focal deficits [Normal Gait] : normal gait [de-identified] : decreased bibasilar breath sounds.  [de-identified] : no calf tenderness bilaterally. b/l varicose veins noted.

## 2021-07-26 NOTE — HISTORY OF PRESENT ILLNESS
[FreeTextEntry1] : patient presents for follow-up\par  [de-identified] : 69 yo male, pmh of DM2, HTN, BPH, CAD who presents today for follow up. \jennifer Stopped taking metfomin back in dec when started on it. \jennifer Was having abdominal cramping and he stopped it. \par Not on any other diabetic medications. \jennifer Recently had optho exams and had a cataract surgery recently. \par Last tick bite was also noted a few weeks ago. Right side of neck\par Experienced some swelling at the time. \jennifer Was able fully remove the tick at that time. \jennifer Was seen at urgent care and completed a course of clindamycine at that time. \par Swelling has since resolved. Requests labs.\par Regarding bph, he sees urology once a year. Nothing abnormal noted as of late per patient. \par No other complaints today.

## 2021-07-26 NOTE — ASSESSMENT
[FreeTextEntry1] : HTN- stable\par -Continue current regimen of BB and ACEi, renewed today \par -Continue regular bp checks at home and advised to bring machine to office with readings next visit\par -Avoid salt\par -encouraged diet and exercise\par -Labs ordered to evaluate for renal function in cmp\par f/u 3 months, pt agreed w/plan\par \par DM\par Controlled per most recent labs in dec 2020\par Last a1c noted at 6.4 in dec 2020\par Checking A1c today\par Urine Microalbumin ordered, is on ace for renal protection\par Annual optho and podiatry exams. had recent optho exam in july 2021\par optho consult reviewed, s/p cataract surgery \par c/w lifestyle modifications, metformin was stopped by patient 2/2 side effects\par pending results of new a1c, will determine what meds if any to restart. \par \par Tick Bite of neck\par no evidence of any current infection over bite area\par checking tick panel for abs, rmsf, alpha gal allergy\par \par CAD\par cardio consult reviewed from jul 2021\par c/w statin low dose asa \par renewed statin\par checking fasting lipids today, goal LDL<70\par \par BPH\par checking tsa today\par renewed flomax\par checking psa today. \par \par will call with results from outside labs\par f/u 3 months. \par Patient agrees with plan, all further questions answered during encounter.\par \par

## 2021-08-10 DIAGNOSIS — E87.5 HYPERKALEMIA: ICD-10-CM

## 2021-09-11 ENCOUNTER — RX RENEWAL (OUTPATIENT)
Age: 71
End: 2021-09-11

## 2021-12-07 LAB — HBA1C MFR BLD HPLC: 6.3

## 2022-01-07 ENCOUNTER — NON-APPOINTMENT (OUTPATIENT)
Age: 72
End: 2022-01-07

## 2022-03-06 ENCOUNTER — RX RENEWAL (OUTPATIENT)
Age: 72
End: 2022-03-06

## 2022-04-28 ENCOUNTER — RX RENEWAL (OUTPATIENT)
Age: 72
End: 2022-04-28

## 2022-05-10 ENCOUNTER — APPOINTMENT (OUTPATIENT)
Dept: FAMILY MEDICINE | Facility: CLINIC | Age: 72
End: 2022-05-10
Payer: MEDICARE

## 2022-05-10 VITALS
SYSTOLIC BLOOD PRESSURE: 122 MMHG | RESPIRATION RATE: 16 BRPM | OXYGEN SATURATION: 97 % | BODY MASS INDEX: 33.33 KG/M2 | WEIGHT: 225 LBS | DIASTOLIC BLOOD PRESSURE: 64 MMHG | HEIGHT: 69 IN | HEART RATE: 74 BPM

## 2022-05-10 VITALS — TEMPERATURE: 97.8 F

## 2022-05-10 DIAGNOSIS — R10.30 LOWER ABDOMINAL PAIN, UNSPECIFIED: ICD-10-CM
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
## 2022-05-10 DIAGNOSIS — Z23 ENCOUNTER FOR IMMUNIZATION: ICD-10-CM

## 2022-05-10 DIAGNOSIS — Z92.29 PERSONAL HISTORY OF OTHER DRUG THERAPY: ICD-10-CM

## 2022-05-10 DIAGNOSIS — I21.9 ACUTE MYOCARDIAL INFARCTION, UNSPECIFIED: ICD-10-CM
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
## 2022-05-10 DIAGNOSIS — S10.96XA INSECT BITE OF UNSPECIFIED PART OF NECK, INITIAL ENCOUNTER: ICD-10-CM

## 2022-05-10 PROCEDURE — 99214 OFFICE O/P EST MOD 30 MIN: CPT

## 2022-05-10 RX ORDER — ASPIRIN 81 MG/1
81 TABLET, COATED ORAL
Qty: 90 | Refills: 0 | Status: ACTIVE | COMMUNITY
Start: 2022-05-10

## 2022-05-10 RX ORDER — FLUTICASONE PROPIONATE 50 UG/1
50 SPRAY, METERED NASAL TWICE DAILY
Qty: 1 | Refills: 2 | Status: ACTIVE | COMMUNITY
Start: 2022-05-10 | End: 1900-01-01

## 2022-05-10 RX ORDER — BROMFENAC SODIUM 0.7 MG/ML
0.07 SOLUTION/ DROPS OPHTHALMIC
Qty: 3 | Refills: 0 | Status: DISCONTINUED | COMMUNITY
Start: 2021-06-30 | End: 2022-05-10

## 2022-05-10 RX ORDER — ALBUTEROL SULFATE 90 UG/1
108 (90 BASE) INHALANT RESPIRATORY (INHALATION)
Qty: 1 | Refills: 5 | Status: DISCONTINUED | COMMUNITY
Start: 2020-12-03 | End: 2022-05-10

## 2022-05-10 RX ORDER — BUDESONIDE AND FORMOTEROL FUMARATE DIHYDRATE 160; 4.5 UG/1; UG/1
160-4.5 AEROSOL RESPIRATORY (INHALATION) TWICE DAILY
Qty: 1 | Refills: 2 | Status: ACTIVE | COMMUNITY
Start: 2020-12-03 | End: 1900-01-01

## 2022-05-10 RX ORDER — METFORMIN ER 500 MG 500 MG/1
500 TABLET ORAL
Qty: 180 | Refills: 0 | Status: DISCONTINUED | COMMUNITY
Start: 2020-12-05 | End: 2022-05-10

## 2022-05-16 ENCOUNTER — RX RENEWAL (OUTPATIENT)
Age: 72
End: 2022-05-16

## 2022-05-19 NOTE — REVIEW OF SYSTEMS
[Fever] : no fever [Chills] : no chills [Chest Pain] : no chest pain [Palpitations] : no palpitations [Shortness Of Breath] : no shortness of breath [Cough] : no cough [Abdominal Pain] : no abdominal pain [Nausea] : no nausea [Constipation] : no constipation [Diarrhea] : no diarrhea [Vomiting] : no vomiting [Melena] : no melena [Dysuria] : no dysuria [Hematuria] : no hematuria [Frequency] : no frequency [Itching] : no itching [Mole Changes] : no mole changes [Skin Rash] : no skin rash [Headache] : no headache [Dizziness] : no dizziness [Fainting] : no fainting [FreeTextEntry4] : +nasal congestion

## 2022-05-19 NOTE — PLAN
[FreeTextEntry1] : \par preop\par labs reviewed\par ekg reviewed\par he will stop ASA 10 days prior to surgery/no nsaids 10 days prior\par nothing to eat or drink the night before the surgery after midnight\par cardio clearance may 16th \par \par rhonci on left side, +COPD \par restart symbicort 2 puffs bid \par cxr stat advised to try to go today r/o pneumonia but asymptomatic, if normal will order ct chest as his entire left side is abnormal w/ rhonci\par denies cough,fevers,chills, he said he feels fine\par \par \par f/u 1month postop  and advised may need to f/u sooner depending on cxr results\par pt agreed w/plan \par \par ADDENDUM : CARDIAC CLEARANCE OBTAINED \par PT IS OPTIMIZED FOR SURGERY

## 2022-05-19 NOTE — ASSESSMENT
[High Risk Surgery - Intraperitoneal, Intrathoracic or Supringuinal Vascular Procedures] : High Risk Surgery - Intraperitoneal, Intrathoracic or Supringuinal Vascular Procedures - No (0) [Ischemic Heart Disease] : Ischemic Heart Disease  - Yes (1) [Congestive Heart Failure] : Congestive Heart Failure - No (0) [Prior Cerebrovascular Accident or TIA] : Prior Cerebrovascular Accident or TIA - No (0) [Creatinine >= 2mg/dL (1 Point)] : Creatinine >= 2mg/dL - No (0) [Insulin-dependent Diabetic (1 Point)] : Insulin-dependent Diabetic - No (0) [1] : 1 , RCRI Class: II, Risk of Post-Op Cardiac Complications: 6.0%, 95% CI for Risk Estimate: 4.9% - 7.4% [Patient Optimized for Surgery] : Patient optimized for surgery

## 2022-05-19 NOTE — PHYSICAL EXAM
[de-identified] : left sided rhonci  [de-identified] : no calf tenderness b/l LE [de-identified] : CN 2-12 INTACT, NORMAL STRENGTH UPPER AND LOWER EXT B/L 5/5, NORMAL RAPID ALTERNATING MOVEMENTS AND FINGER TO NOSE

## 2022-05-19 NOTE — HISTORY OF PRESENT ILLNESS
[Aortic Stenosis] : no aortic stenosis [Atrial Fibrillation] : no atrial fibrillation [Recent Myocardial Infarction] : no recent myocardial infarction [Asthma] : no asthma [Sleep Apnea] : no sleep apnea [Smoker] : not a smoker [Family Member] : no family member with adverse anesthesia reaction/sudden death [Self] : no previous adverse anesthesia reaction [Chronic Anticoagulation] : no chronic anticoagulation [Chronic Kidney Disease] : no chronic kidney disease [FreeTextEntry1] : L5 Fusion [FreeTextEntry2] : 5/23/22 [FreeTextEntry3] : Dr Chu [FreeTextEntry4] : Pt has lumbar fusion at [facility] 5/23/22 with Dr Chu, wife will be driving pt to hospital.\par he is feeling well\par  [FreeTextEntry5] : knee replacement and stents no other implantable devices , hx MI in the past 2010 and 2012- 3 stents total  [FreeTextEntry8] : can climb 2-3 flights of stairs without issues

## 2022-05-26 ENCOUNTER — RX CHANGE (OUTPATIENT)
Age: 72
End: 2022-05-26

## 2022-05-26 RX ORDER — ROSUVASTATIN CALCIUM 10 MG/1
10 TABLET, FILM COATED ORAL
Qty: 30 | Refills: 0 | Status: DISCONTINUED | COMMUNITY
Start: 2017-10-08 | End: 2022-05-26

## 2022-05-26 RX ORDER — QUINAPRIL HYDROCHLORIDE 20 MG/1
20 TABLET, FILM COATED ORAL
Qty: 30 | Refills: 0 | Status: DISCONTINUED | COMMUNITY
Start: 2017-10-15 | End: 2022-05-26

## 2022-05-31 ENCOUNTER — NON-APPOINTMENT (OUTPATIENT)
Age: 72
End: 2022-05-31

## 2022-06-09 ENCOUNTER — RX RENEWAL (OUTPATIENT)
Age: 72
End: 2022-06-09

## 2022-06-15 ENCOUNTER — APPOINTMENT (OUTPATIENT)
Dept: FAMILY MEDICINE | Facility: CLINIC | Age: 72
End: 2022-06-15
Payer: MEDICARE

## 2022-06-15 ENCOUNTER — NON-APPOINTMENT (OUTPATIENT)
Age: 72
End: 2022-06-15

## 2022-06-15 VITALS
RESPIRATION RATE: 15 BRPM | DIASTOLIC BLOOD PRESSURE: 56 MMHG | SYSTOLIC BLOOD PRESSURE: 100 MMHG | BODY MASS INDEX: 31.55 KG/M2 | WEIGHT: 213 LBS | HEIGHT: 69 IN | TEMPERATURE: 97.8 F | HEART RATE: 76 BPM | OXYGEN SATURATION: 97 %

## 2022-06-15 PROCEDURE — 99214 OFFICE O/P EST MOD 30 MIN: CPT

## 2022-06-15 NOTE — ASSESSMENT
[FreeTextEntry1] : HTN- stable\par -Continue current regimen of betablocker, ace d'c'd due to hypotension\par -pt is running soft bps, agree with stopping ace. \par -Continue regular bp checks at home and advised to bring machine to office with readings next visit\par -Avoid salt\par -encouraged diet and exercise\par -Labs ordered to evaluate for renal function in cmp, blood count with cbc. \par \par DM\par Controlled \par Last a1c noted at 8/2021\par Checking A1c today\par Urine Microalbumin \par Annual optho and podiatry exams. \par c/w diet control of DM\par lifestyle modifications discussed, will retest HBA1C in 3 months.\par \par HLD\par continue statin use\par LFTs today\par Fasting lipid profile ordered today.\par \par f/u 3 months or sooner prn based on labs \par labs to be done at outside lab, will notify patient of results when available and received.\par Patient agrees with plan, all further questions answered during encounter.\par \par

## 2022-06-15 NOTE — PHYSICAL EXAM
[No Lymphadenopathy] : no lymphadenopathy [Normal] : no respiratory distress, lungs were clear to auscultation bilaterally and no accessory muscle use [Normal Rate] : normal rate  [Regular Rhythm] : with a regular rhythm [Normal S1, S2] : normal S1 and S2 [Soft] : abdomen soft [Non Tender] : non-tender [Normal Bowel Sounds] : normal bowel sounds [Coordination Grossly Intact] : coordination grossly intact [No Focal Deficits] : no focal deficits [de-identified] : midline spinal incision in lumbar region noted, clean, dry, intact.  [de-identified] : using cane for ambulation

## 2022-06-15 NOTE — HISTORY OF PRESENT ILLNESS
[Spouse] : spouse [FreeTextEntry1] : Patient presents for a follow up on blood pressure and medication  [de-identified] : 72 yo male, hx of t2dm, hld, s/p recent L5 fusion who comes for follow up of htn and for labs. \par He saw surgery yesterday, was making good progress with wound healing.  \par He has PT coming to house twice a week. \par While in rehab, noted bp to be low, was stopped on quinapril, still on lopressor. \par Will do labs outside. \par Has no other complaints today. \par \par

## 2022-07-01 ENCOUNTER — APPOINTMENT (OUTPATIENT)
Dept: FAMILY MEDICINE | Facility: CLINIC | Age: 72
End: 2022-07-01

## 2022-07-11 ENCOUNTER — APPOINTMENT (OUTPATIENT)
Dept: FAMILY MEDICINE | Facility: CLINIC | Age: 72
End: 2022-07-11

## 2022-07-11 LAB — HBA1C MFR BLD HPLC: 6.3

## 2022-07-13 ENCOUNTER — NON-APPOINTMENT (OUTPATIENT)
Age: 72
End: 2022-07-13

## 2022-07-13 ENCOUNTER — APPOINTMENT (OUTPATIENT)
Dept: FAMILY MEDICINE | Facility: CLINIC | Age: 72
End: 2022-07-13

## 2022-07-13 VITALS
OXYGEN SATURATION: 96 % | BODY MASS INDEX: 32.58 KG/M2 | HEART RATE: 69 BPM | WEIGHT: 220 LBS | SYSTOLIC BLOOD PRESSURE: 118 MMHG | HEIGHT: 69 IN | RESPIRATION RATE: 14 BRPM | DIASTOLIC BLOOD PRESSURE: 62 MMHG

## 2022-07-13 VITALS — SYSTOLIC BLOOD PRESSURE: 120 MMHG | DIASTOLIC BLOOD PRESSURE: 60 MMHG

## 2022-07-13 VITALS — TEMPERATURE: 98.2 F

## 2022-07-13 PROCEDURE — 99214 OFFICE O/P EST MOD 30 MIN: CPT | Mod: 25

## 2022-07-13 PROCEDURE — 93000 ELECTROCARDIOGRAM COMPLETE: CPT

## 2022-07-13 NOTE — PLAN
[FreeTextEntry1] : \par \par HTN now w/ dizziness w/recent fall \par -Continue metoprolol tartrate 25mg 1/2 tab po bid \par -continue to monitor bp at home\par -advised to see cardiologist for eval\par orthostatics neg and pt did not feel dizzy when this was performed\par -EKG- NSR @65    BPM, NORMAL AXIS, NORMAL IN/QT INTERVAL, NO ST/ T WAVE CHANGES NOTED\par EKG reviewed\par advised to change positions slowly\par advised to increase water intake to 64oz per day\par \par \par f/u after cardiologist or 1month, or sooner if issues arise \par pt agreed w/plan \par

## 2022-07-13 NOTE — HISTORY OF PRESENT ILLNESS
[FreeTextEntry1] : patient presents for blood pressure check  [de-identified] : 72yo M presents for f/u for HTN and c/o dizziness and recent fall \par \par 5 days ago he was kneeling on all fours and got up and was very dizzy and was walking in a [place] and then he fell \par bp was normal when this happened he said\par he denies loss of consciousness\par denies hitting head\par he fell and scraped his right arm and right leg\par this dizziness was more serious than the dizziness he had in [place] when he was on the quinapril \par he has not been on quinapril since that first incident\par denies using oxycodone when this happened\par \par Denies chest pain, palpitations, shortness of breath, Headaches, vision changes or LE edema\par \par bp has been\par 6/30 122/86 \par 7/1 126/78  \par 7/2 92/62 -denies dizziness\par 7/3  122/73\par 7/4  148/89 \par 7/9- 138/81 \par 7/10 149/76\par 7/10 100/63\par \par last cardiologist appt was  "a while ago"\par

## 2022-07-13 NOTE — PHYSICAL EXAM
[No Lymphadenopathy] : no lymphadenopathy [Supple] : supple [Normal] : normal rate, regular rhythm, normal S1 and S2 and no murmur heard [No Edema] : there was no peripheral edema [No Focal Deficits] : no focal deficits [Normal Affect] : the affect was normal [Normal Mood] : the mood was normal [Normal Insight/Judgement] : insight and judgment were intact [de-identified] : no calf tenderness b/l LE [de-identified] : CN 2-12 INTACT, NORMAL STRENGTH UPPER AND LOWER EXT B/L 5/5, NORMAL RAPID ALTERNATING MOVEMENTS AND FINGER TO NOSE

## 2022-07-13 NOTE — REVIEW OF SYSTEMS
[Vision Problems] : no vision problems [Chest Pain] : no chest pain [Palpitations] : no palpitations [Shortness Of Breath] : no shortness of breath [Headache] : no headache [Dizziness] : no dizziness

## 2022-08-21 ENCOUNTER — RX RENEWAL (OUTPATIENT)
Age: 72
End: 2022-08-21

## 2022-08-23 ENCOUNTER — NON-APPOINTMENT (OUTPATIENT)
Age: 72
End: 2022-08-23

## 2022-08-24 ENCOUNTER — RX RENEWAL (OUTPATIENT)
Age: 72
End: 2022-08-24

## 2022-08-25 ENCOUNTER — RX RENEWAL (OUTPATIENT)
Age: 72
End: 2022-08-25

## 2022-09-01 ENCOUNTER — RX RENEWAL (OUTPATIENT)
Age: 72
End: 2022-09-01

## 2022-09-01 RX ORDER — AZELASTINE HYDROCHLORIDE 137 UG/1
0.1 SPRAY, METERED NASAL TWICE DAILY
Qty: 1 | Refills: 2 | Status: ACTIVE | COMMUNITY
Start: 2022-05-10 | End: 1900-01-01

## 2022-09-16 ENCOUNTER — APPOINTMENT (OUTPATIENT)
Dept: FAMILY MEDICINE | Facility: CLINIC | Age: 72
End: 2022-09-16

## 2022-09-16 VITALS
OXYGEN SATURATION: 98 % | DIASTOLIC BLOOD PRESSURE: 80 MMHG | WEIGHT: 220 LBS | SYSTOLIC BLOOD PRESSURE: 130 MMHG | HEIGHT: 69 IN | RESPIRATION RATE: 14 BRPM | HEART RATE: 68 BPM | BODY MASS INDEX: 32.58 KG/M2

## 2022-09-16 VITALS — TEMPERATURE: 97.8 F

## 2022-09-16 DIAGNOSIS — R06.89 OTHER ABNORMALITIES OF BREATHING: ICD-10-CM

## 2022-09-16 DIAGNOSIS — Z87.898 PERSONAL HISTORY OF OTHER SPECIFIED CONDITIONS: ICD-10-CM

## 2022-09-16 DIAGNOSIS — R93.89 ABNORMAL FINDINGS ON DIAGNOSTIC IMAGING OF OTHER SPECIFIED BODY STRUCTURES: ICD-10-CM

## 2022-09-16 DIAGNOSIS — Z23 ENCOUNTER FOR IMMUNIZATION: ICD-10-CM

## 2022-09-16 DIAGNOSIS — Z86.39 PERSONAL HISTORY OF OTHER ENDOCRINE, NUTRITIONAL AND METABOLIC DISEASE: ICD-10-CM

## 2022-09-16 DIAGNOSIS — I25.10 ATHEROSCLEROTIC HEART DISEASE OF NATIVE CORONARY ARTERY W/OUT ANGINA PECTORIS: ICD-10-CM

## 2022-09-16 DIAGNOSIS — Z87.39 PERSONAL HISTORY OF OTHER DISEASES OF THE MUSCULOSKELETAL SYSTEM AND CONNECTIVE TISSUE: ICD-10-CM

## 2022-09-16 DIAGNOSIS — R09.89 OTHER SPECIFIED SYMPTOMS AND SIGNS INVOLVING THE CIRCULATORY AND RESPIRATORY SYSTEMS: ICD-10-CM

## 2022-09-16 DIAGNOSIS — Z87.09 PERSONAL HISTORY OF OTHER DISEASES OF THE RESPIRATORY SYSTEM: ICD-10-CM

## 2022-09-16 PROCEDURE — 99215 OFFICE O/P EST HI 40 MIN: CPT | Mod: 25

## 2022-09-16 NOTE — PHYSICAL EXAM
[No Acute Distress] : no acute distress [Normal Sclera/Conjunctiva] : normal sclera/conjunctiva [Normal Outer Ear/Nose] : the outer ears and nose were normal in appearance [No JVD] : no jugular venous distention [No Lymphadenopathy] : no lymphadenopathy [Supple] : supple [Thyroid Normal, No Nodules] : the thyroid was normal and there were no nodules present [No Respiratory Distress] : no respiratory distress  [No Accessory Muscle Use] : no accessory muscle use [Normal Rate] : normal rate  [Regular Rhythm] : with a regular rhythm [Normal S1, S2] : normal S1 and S2 [No Murmur] : no murmur heard [No Carotid Bruits] : no carotid bruits [Normal Gait] : normal gait [Normal Affect] : the affect was normal

## 2022-09-17 DIAGNOSIS — B96.20 URINARY TRACT INFECTION, SITE NOT SPECIFIED: ICD-10-CM

## 2022-09-17 DIAGNOSIS — N39.0 URINARY TRACT INFECTION, SITE NOT SPECIFIED: ICD-10-CM

## 2022-09-19 NOTE — RESULTS/DATA
[] : results reviewed [de-identified] : NSR  [de-identified] : urinalysis + E. Coli UTI with sensitive to Augmentin

## 2022-09-19 NOTE — HISTORY OF PRESENT ILLNESS
[Coronary Artery Disease] : coronary artery disease [COPD] : COPD [No Adverse Anesthesia Reaction] : no adverse anesthesia reaction in self or family member [(Patient denies any chest pain, claudication, dyspnea on exertion, orthopnea, palpitations or syncope)] : Patient denies any chest pain, claudication, dyspnea on exertion, orthopnea, palpitations or syncope [Aortic Stenosis] : no aortic stenosis [Atrial Fibrillation] : no atrial fibrillation [Recent Myocardial Infarction] : no recent myocardial infarction [Implantable Device/Pacemaker] : no implantable device/pacemaker [Asthma] : no asthma [Sleep Apnea] : no sleep apnea [Smoker] : not a smoker [Chronic Anticoagulation] : no chronic anticoagulation [Chronic Kidney Disease] : no chronic kidney disease [Diabetes] : no diabetes [FreeTextEntry1] : Aortic Extension procedure  [FreeTextEntry2] : 09/21/2022 [FreeTextEntry3] : Dr. Emerson at   [FreeTextEntry4] : 71 yr old male is here for medical clearance. Denies changes in vision, dizziness, chest pain, palpitations and lower extremity edema.\par

## 2022-09-19 NOTE — ASSESSMENT
[Patient Optimized for Surgery] : Patient optimized for surgery [No Further Testing Recommended] : no further testing recommended [Modify medications prior to procedure] : Modify medications prior to procedure [As per surgery] : as per surgery [FreeTextEntry4] : Patient is low risk and medically cleared for planned procedure on 9/21/22\par + E. coli UTI sensitive to Macrobid  and EKG have been reviewed \par Denies history of Myocardial infraction, CVA and FLOWER \par May continue to take prescribed medications as directed. \par NPO after midnight prior to surgery \par may take morning medications with sips of water morning of procedure \par Advised not to take ASA and or NSAIDs prior  to surgery\par  [FreeTextEntry7] : take Macrobid 100 mg PO BID prior to planned procedure

## 2022-09-23 ENCOUNTER — NON-APPOINTMENT (OUTPATIENT)
Age: 72
End: 2022-09-23

## 2022-11-26 ENCOUNTER — RX RENEWAL (OUTPATIENT)
Age: 72
End: 2022-11-26

## 2022-12-04 ENCOUNTER — RX RENEWAL (OUTPATIENT)
Age: 72
End: 2022-12-04

## 2022-12-09 ENCOUNTER — APPOINTMENT (OUTPATIENT)
Dept: FAMILY MEDICINE | Facility: CLINIC | Age: 72
End: 2022-12-09

## 2022-12-09 ENCOUNTER — APPOINTMENT (OUTPATIENT)
Dept: FAMILY MEDICINE | Facility: CLINIC | Age: 72
End: 2022-12-09
Payer: MEDICARE

## 2022-12-09 DIAGNOSIS — J11.1 INFLUENZA DUE TO UNIDENTIFIED INFLUENZA VIRUS WITH OTHER RESPIRATORY MANIFESTATIONS: ICD-10-CM

## 2022-12-09 PROCEDURE — ZZZZZ: CPT

## 2022-12-09 PROCEDURE — 99202 OFFICE O/P NEW SF 15 MIN: CPT | Mod: 95

## 2022-12-09 PROCEDURE — 99212 OFFICE O/P EST SF 10 MIN: CPT | Mod: 95

## 2022-12-09 NOTE — PLAN
[FreeTextEntry1] : Viral URI, symptoms will likely resolve in 7-10 days but my worsen in 4-5 days. Recommend treatment focusing on decreasing severity of symptoms including salt gargles, over the counter Cepacol lozenges as needed for sore throat, over the counter saline nasal spray as needed for congestion. No antibiotics at this time. Wash hands to prevent the spread of infection, drink plenty of fluids, get appropriate rest, and maintain adequate nutrition. Follow up in 4-5 days if symptoms worsen or if symptoms persists beyond 14 days. Cough my persist for up to 3 weeks after other symptoms resolve. Advised patient to refer to emergency room if temperature 104 or greater, confusion, severe headache, neck pain, stiffness, inability to swallow, difficulty breathing, drooling, chest pain, abdominal pain, vomiting or shortness of breath.\par \par will send viral swab\par likely flu\par will start tamiflu

## 2022-12-09 NOTE — HISTORY OF PRESENT ILLNESS
[Home] : at home, [unfilled] , at the time of the visit. [Medical Office: ([facility])___] : at the medical office located in  [Verbal consent obtained from patient] : the patient, [unfilled] [FreeTextEntry8] : Presenting via telehealth with achiness, feveish, congested nose, temperature of 100 degrees yesterday was 102. He took home covid test and it is negative. He denies chest pains, cough, but does have nasal congestion. He is achi all over.

## 2022-12-12 LAB
RAPID RVP RESULT: DETECTED
SARS-COV-2 RNA PNL RESP NAA+PROBE: DETECTED

## 2022-12-12 RX ORDER — OSELTAMIVIR PHOSPHATE 75 MG/1
75 CAPSULE ORAL TWICE DAILY
Qty: 10 | Refills: 0 | Status: DISCONTINUED | COMMUNITY
Start: 2022-12-09 | End: 2022-12-12

## 2022-12-12 RX ORDER — AMOXICILLIN AND CLAVULANATE POTASSIUM 875; 125 MG/1; MG/1
875-125 TABLET, COATED ORAL
Qty: 14 | Refills: 0 | Status: COMPLETED | COMMUNITY
Start: 2022-09-17 | End: 2022-12-12

## 2022-12-14 ENCOUNTER — RX RENEWAL (OUTPATIENT)
Age: 72
End: 2022-12-14

## 2022-12-29 ENCOUNTER — OFFICE (OUTPATIENT)
Dept: URBAN - METROPOLITAN AREA CLINIC 70 | Facility: CLINIC | Age: 72
Setting detail: OPHTHALMOLOGY
End: 2022-12-29
Payer: MEDICARE

## 2022-12-29 ENCOUNTER — RX ONLY (RX ONLY)
Age: 72
End: 2022-12-29

## 2022-12-29 DIAGNOSIS — Z96.1: ICD-10-CM

## 2022-12-29 DIAGNOSIS — H25.12: ICD-10-CM

## 2022-12-29 DIAGNOSIS — H16.223: ICD-10-CM

## 2022-12-29 PROCEDURE — 99213 OFFICE O/P EST LOW 20 MIN: CPT | Performed by: OPHTHALMOLOGY

## 2022-12-29 ASSESSMENT — REFRACTION_CURRENTRX
OD_SPHERE: -2.00
OS_VPRISM_DIRECTION: BF
OS_SPHERE: +2.25
OS_CYLINDER: -0.50
OS_AXIS: 92
OS_SPHERE: +0.25
OD_CYLINDER: -0.75
OS_ADD: +2.50
OD_OVR_VA: 20/
OD_ADD: +2.50
OS_OVR_VA: 20/
OS_OVR_VA: 20/
OD_VPRISM_DIRECTION: BF
OD_OVR_VA: 20/
OD_SPHERE: +2.25
OD_AXIS: 92

## 2022-12-29 ASSESSMENT — AXIALLENGTH_DERIVED
OS_AL: 23.2694
OD_AL: 23.0455
OD_AL: 24.7955
OS_AL: 23.3645

## 2022-12-29 ASSESSMENT — REFRACTION_MANIFEST
OD_ADD: +2.50
OD_AXIS: 090
OS_VA1: 20/20-2
OS_ADD: +2.50
OS_AXIS: 090
OD_CYLINDER: -1.00
OD_SPHERE: PL
OU_VA: 20/20-2
OS_ADD: +2.50
OD_CYLINDER: SPH
OS_SPHERE: PL
OD_VA1: 20/40
OD_VA2: 20/20
OD_ADD: +2.50
OS_CYLINDER: -0.50
OS_VA2: 20/20
OD_SPHERE: -3.50
OS_SPHERE: +0.25
OD_VA2: 20/20
OS_VA2: 20/20
OS_CYLINDER: SPH

## 2022-12-29 ASSESSMENT — KERATOMETRY
OD_K2POWER_DIOPTERS: 45.00
OD_AXISANGLE_DEGREES: 114
OS_AXISANGLE_DEGREES: 067
OS_K2POWER_DIOPTERS: 44.50
OS_K1POWER_DIOPTERS: 43.75
OD_K1POWER_DIOPTERS: 44.25

## 2022-12-29 ASSESSMENT — SUPERFICIAL PUNCTATE KERATITIS (SPK)
OS_SPK: T
OD_SPK: T

## 2022-12-29 ASSESSMENT — CONFRONTATIONAL VISUAL FIELD TEST (CVF)
OD_FINDINGS: FULL
OS_FINDINGS: FULL

## 2022-12-29 ASSESSMENT — SPHEQUIV_DERIVED
OS_SPHEQUIV: 0
OD_SPHEQUIV: 0.375
OD_SPHEQUIV: -4
OS_SPHEQUIV: 0.25

## 2022-12-29 ASSESSMENT — VISUAL ACUITY
OS_BCVA: 20/30-1
OD_BCVA: 20/20-1

## 2022-12-29 ASSESSMENT — REFRACTION_AUTOREFRACTION
OD_AXIS: 052
OD_CYLINDER: -0.75
OD_SPHERE: +0.75
OS_SPHERE: +0.50
OS_AXIS: 119
OS_CYLINDER: -0.50

## 2023-01-26 ENCOUNTER — RX RENEWAL (OUTPATIENT)
Age: 73
End: 2023-01-26

## 2023-02-04 ENCOUNTER — RX RENEWAL (OUTPATIENT)
Age: 73
End: 2023-02-04

## 2023-02-04 RX ORDER — AZELASTINE HYDROCHLORIDE 137 UG/1
137 SPRAY, METERED NASAL
Qty: 1 | Refills: 2 | Status: ACTIVE | COMMUNITY
Start: 2023-02-04 | End: 1900-01-01

## 2023-02-08 ENCOUNTER — APPOINTMENT (OUTPATIENT)
Dept: FAMILY MEDICINE | Facility: CLINIC | Age: 73
End: 2023-02-08
Payer: MEDICARE

## 2023-02-08 VITALS
OXYGEN SATURATION: 98 % | HEIGHT: 69 IN | WEIGHT: 220 LBS | HEART RATE: 61 BPM | SYSTOLIC BLOOD PRESSURE: 124 MMHG | TEMPERATURE: 96 F | RESPIRATION RATE: 14 BRPM | DIASTOLIC BLOOD PRESSURE: 78 MMHG | BODY MASS INDEX: 32.58 KG/M2

## 2023-02-08 DIAGNOSIS — J44.9 CHRONIC OBSTRUCTIVE PULMONARY DISEASE, UNSPECIFIED: Chronic | ICD-10-CM

## 2023-02-08 DIAGNOSIS — I71.9 AORTIC ANEURYSM OF UNSPECIFIED SITE, W/OUT RUPTURE: Chronic | ICD-10-CM

## 2023-02-08 DIAGNOSIS — U07.1 COVID-19: ICD-10-CM

## 2023-02-08 DIAGNOSIS — Z01.818 ENCOUNTER FOR OTHER PREPROCEDURAL EXAMINATION: ICD-10-CM

## 2023-02-08 DIAGNOSIS — Z87.09 PERSONAL HISTORY OF OTHER DISEASES OF THE RESPIRATORY SYSTEM: ICD-10-CM

## 2023-02-08 DIAGNOSIS — Z12.11 ENCOUNTER FOR SCREENING FOR MALIGNANT NEOPLASM OF COLON: ICD-10-CM

## 2023-02-08 PROCEDURE — 99214 OFFICE O/P EST MOD 30 MIN: CPT

## 2023-02-08 RX ORDER — NITROFURANTOIN (MONOHYDRATE/MACROCRYSTALS) 25; 75 MG/1; MG/1
100 CAPSULE ORAL
Qty: 10 | Refills: 0 | Status: DISCONTINUED | COMMUNITY
Start: 2022-09-19 | End: 2023-02-08

## 2023-02-08 RX ORDER — OXYCODONE 10 MG/1
10 TABLET ORAL
Qty: 42 | Refills: 0 | Status: DISCONTINUED | COMMUNITY
Start: 2022-06-08 | End: 2023-02-08

## 2023-02-08 RX ORDER — NIRMATRELVIR AND RITONAVIR 300-100 MG
20 X 150 MG & KIT ORAL
Qty: 30 | Refills: 0 | Status: DISCONTINUED | COMMUNITY
Start: 2022-12-12 | End: 2023-02-08

## 2023-02-08 NOTE — HISTORY OF PRESENT ILLNESS
[FreeTextEntry1] : Patient presents for blood pressure check  [de-identified] : He received a notice from [facility] that his quinapril was recalled.  He went to his local pharmacy and the medication was changed to quinapril that was not involved with the recall.  He is comfortable with the medication so far.  He had back surgery and it went well.

## 2023-02-08 NOTE — PLAN
[FreeTextEntry1] : The letter he received from [facility] was reviewed, he is comfortable staying on quinapril at his current dose as he is no longer receiving the lot that was involved in the recall.  The most recent cardiology and ophthalmology notes were reviewed and his conditions are stable, colonoscopy was ordered and he will follow-up in 3 to 4 months or sooner if needed

## 2023-04-06 ENCOUNTER — RX RENEWAL (OUTPATIENT)
Age: 73
End: 2023-04-06

## 2023-05-29 ENCOUNTER — RX RENEWAL (OUTPATIENT)
Age: 73
End: 2023-05-29

## 2023-07-18 ENCOUNTER — APPOINTMENT (OUTPATIENT)
Dept: FAMILY MEDICINE | Facility: CLINIC | Age: 73
End: 2023-07-18
Payer: MEDICARE

## 2023-07-18 VITALS
OXYGEN SATURATION: 96 % | HEIGHT: 69 IN | SYSTOLIC BLOOD PRESSURE: 122 MMHG | WEIGHT: 220 LBS | TEMPERATURE: 98.4 F | DIASTOLIC BLOOD PRESSURE: 82 MMHG | RESPIRATION RATE: 16 BRPM | BODY MASS INDEX: 32.58 KG/M2 | HEART RATE: 71 BPM

## 2023-07-18 DIAGNOSIS — R19.5 OTHER FECAL ABNORMALITIES: ICD-10-CM

## 2023-07-18 DIAGNOSIS — R10.9 UNSPECIFIED ABDOMINAL PAIN: ICD-10-CM

## 2023-07-18 PROCEDURE — 99214 OFFICE O/P EST MOD 30 MIN: CPT

## 2023-07-18 NOTE — HISTORY OF PRESENT ILLNESS
[___ Days ago] : [unfilled] days ago [Constant] : constant [Diarrhea] : diarrhea [Abdominal Pain] : abdominal pain [Nausea] : no nausea [Vomiting] : no vomiting [Anorexia] : no anorexia [Sore Throat] : no sore throat [FreeTextEntry8] : JOSUE ARMENTA is a 72 year male who presents today Jul 18, 2023 for abdominal cramping and loose stools x 1 week.\par \par Last week he had abdominal cramping and loose stools x 1 day.\par Then today he got abdominal cramping and loose stools again. When abdominal cramping comes on he reports a 5 out of 10 pain scale. \par He reports 5 loose BMs today. \par He thinks he has acute diverticulitis flare and requesting antibiotics.\par He has history of diverticulitis in Oct 2020. \par Recent colonoscopy May 2023. As per patient, diverticulosis. Will request records. \par \par He denies any abdominal pain  or cramping right now.\par No fevers, nausea, vomiting, or weakness.\par He is tolerating oral fluids and food. \par

## 2023-07-18 NOTE — PHYSICAL EXAM
[Normal] : no acute distress, well nourished, well developed and well-appearing [No Respiratory Distress] : no respiratory distress  [No Accessory Muscle Use] : no accessory muscle use [Normal Rate] : normal rate  [Regular Rhythm] : with a regular rhythm [Normal S1, S2] : normal S1 and S2 [Soft] : abdomen soft [Non Tender] : non-tender [Non-distended] : non-distended [Normal Bowel Sounds] : normal bowel sounds [Normal Affect] : the affect was normal [Alert and Oriented x3] : oriented to person, place, and time [Normal Insight/Judgement] : insight and judgment were intact [de-identified] : coarse lung sound bi laterally

## 2023-07-18 NOTE — REVIEW OF SYSTEMS
[Fever] : no fever [Chills] : no chills [Fatigue] : no fatigue [Negative] : Cardiovascular [FreeTextEntry7] : abdominal camping and loose stools

## 2023-07-18 NOTE — ASSESSMENT
[FreeTextEntry1] : JOSUE ARMENTA is a 72 year male who presents today Jul 18, 2023 with on and off abdominal cramping and loose stools x 1 week\par \par Abdominal cramping and loose stools\par - Patient was educated that antibiotics at this time are not necessary. Despite this education, patient would like to proceed with antibiotics for suspected diverticulitis. \par - Will order CT of Abd and pelvis with Iv contrast to r/o diverticulitis\par - advised Clear liquid diet for now\par - Advance diet as tolerated.\par - Tylenol as needed for pain control\par \par If symptoms worsen or do not improve in 7 days then return to office. \par If you develop fevers, severe abdominal pain, persistent vomiting and/or diarrhea, weakness then go to local ER.

## 2023-08-01 ENCOUNTER — RX RENEWAL (OUTPATIENT)
Age: 73
End: 2023-08-01

## 2023-09-01 ENCOUNTER — RX RENEWAL (OUTPATIENT)
Age: 73
End: 2023-09-01

## 2023-10-27 ENCOUNTER — RX RENEWAL (OUTPATIENT)
Age: 73
End: 2023-10-27

## 2023-12-04 ENCOUNTER — RX RENEWAL (OUTPATIENT)
Age: 73
End: 2023-12-04

## 2024-01-02 ENCOUNTER — OFFICE (OUTPATIENT)
Dept: URBAN - METROPOLITAN AREA CLINIC 103 | Facility: CLINIC | Age: 74
Setting detail: OPHTHALMOLOGY
End: 2024-01-02
Payer: MEDICARE

## 2024-01-02 DIAGNOSIS — H16.223: ICD-10-CM

## 2024-01-02 DIAGNOSIS — H35.033: ICD-10-CM

## 2024-01-02 DIAGNOSIS — H25.12: ICD-10-CM

## 2024-01-02 PROBLEM — H26.491 POSTERIOR CAPSULAR OPACIFICATION; RIGHT EYE: Status: ACTIVE | Noted: 2024-01-02

## 2024-01-02 PROCEDURE — 92014 COMPRE OPH EXAM EST PT 1/>: CPT | Performed by: OPHTHALMOLOGY

## 2024-01-02 PROCEDURE — 92250 FUNDUS PHOTOGRAPHY W/I&R: CPT | Performed by: OPHTHALMOLOGY

## 2024-01-02 ASSESSMENT — REFRACTION_MANIFEST
OD_ADD: +2.50
OD_AXIS: 090
OS_VA2: 20/20
OS_ADD: +2.50
OS_SPHERE: PL
OD_VA2: 20/20
OU_VA: 20/20-2
OS_CYLINDER: -0.50
OS_VA2: 20/20
OS_ADD: +2.50
OS_CYLINDER: SPH
OS_VA1: 20/20-2
OD_CYLINDER: SPH
OD_CYLINDER: -1.00
OS_SPHERE: +0.25
OD_VA1: 20/40
OS_AXIS: 090
OD_ADD: +2.50
OD_VA2: 20/20
OD_SPHERE: PL
OD_SPHERE: -3.50

## 2024-01-02 ASSESSMENT — REFRACTION_AUTOREFRACTION
OD_SPHERE: +0.25
OS_CYLINDER: SPHERE
OD_CYLINDER: -0.50
OS_SPHERE: PLANO
OD_AXIS: 059

## 2024-01-02 ASSESSMENT — CONFRONTATIONAL VISUAL FIELD TEST (CVF)
OD_FINDINGS: FULL
OS_FINDINGS: FULL

## 2024-01-02 ASSESSMENT — REFRACTION_CURRENTRX
OD_ADD: +2.50
OS_CYLINDER: -0.50
OD_AXIS: 92
OS_OVR_VA: 20/
OS_SPHERE: +0.25
OS_AXIS: 92
OD_OVR_VA: 20/
OS_OVR_VA: 20/
OD_VPRISM_DIRECTION: BF
OS_VPRISM_DIRECTION: BF
OS_ADD: +2.50
OD_SPHERE: +2.25
OD_OVR_VA: 20/
OD_SPHERE: -2.00
OD_CYLINDER: -0.75
OS_SPHERE: +2.25

## 2024-01-02 ASSESSMENT — SPHEQUIV_DERIVED
OD_SPHEQUIV: 0
OS_SPHEQUIV: 0
OD_SPHEQUIV: -4

## 2024-01-02 ASSESSMENT — SUPERFICIAL PUNCTATE KERATITIS (SPK)
OD_SPK: T
OS_SPK: T

## 2024-02-26 ENCOUNTER — RX RENEWAL (OUTPATIENT)
Age: 74
End: 2024-02-26

## 2024-04-03 ENCOUNTER — RX RENEWAL (OUTPATIENT)
Age: 74
End: 2024-04-03

## 2024-04-18 ENCOUNTER — RX RENEWAL (OUTPATIENT)
Age: 74
End: 2024-04-18

## 2024-05-21 ENCOUNTER — APPOINTMENT (OUTPATIENT)
Dept: FAMILY MEDICINE | Facility: CLINIC | Age: 74
End: 2024-05-21
Payer: MEDICARE

## 2024-05-21 VITALS — OXYGEN SATURATION: 98 % | HEART RATE: 61 BPM

## 2024-05-21 VITALS
HEART RATE: 56 BPM | DIASTOLIC BLOOD PRESSURE: 68 MMHG | BODY MASS INDEX: 32.58 KG/M2 | WEIGHT: 220 LBS | SYSTOLIC BLOOD PRESSURE: 122 MMHG | TEMPERATURE: 97.9 F | HEIGHT: 69 IN | OXYGEN SATURATION: 95 % | RESPIRATION RATE: 16 BRPM

## 2024-05-21 DIAGNOSIS — E78.5 HYPERLIPIDEMIA, UNSPECIFIED: ICD-10-CM

## 2024-05-21 DIAGNOSIS — Z12.5 ENCOUNTER FOR SCREENING FOR MALIGNANT NEOPLASM OF PROSTATE: ICD-10-CM

## 2024-05-21 DIAGNOSIS — Z13.29 ENCOUNTER FOR SCREENING FOR OTHER SUSPECTED ENDOCRINE DISORDER: ICD-10-CM

## 2024-05-21 DIAGNOSIS — I10 ESSENTIAL (PRIMARY) HYPERTENSION: ICD-10-CM

## 2024-05-21 DIAGNOSIS — R09.89 OTHER SPECIFIED SYMPTOMS AND SIGNS INVOLVING THE CIRCULATORY AND RESPIRATORY SYSTEMS: ICD-10-CM

## 2024-05-21 DIAGNOSIS — R06.89 OTHER ABNORMALITIES OF BREATHING: ICD-10-CM

## 2024-05-21 PROCEDURE — 99214 OFFICE O/P EST MOD 30 MIN: CPT

## 2024-05-21 PROCEDURE — G2211 COMPLEX E/M VISIT ADD ON: CPT

## 2024-05-21 RX ORDER — TAMSULOSIN HYDROCHLORIDE 0.4 MG/1
0.4 CAPSULE ORAL DAILY
Refills: 0 | Status: DISCONTINUED | COMMUNITY
End: 2024-05-21

## 2024-05-21 RX ORDER — SULFAMETHOXAZOLE AND TRIMETHOPRIM 800; 160 MG/1; MG/1
800-160 TABLET ORAL TWICE DAILY
Qty: 14 | Refills: 0 | Status: DISCONTINUED | COMMUNITY
Start: 2022-07-06 | End: 2024-05-21

## 2024-05-21 RX ORDER — QUINAPRIL HYDROCHLORIDE 20 MG/1
20 TABLET, FILM COATED ORAL
Qty: 14 | Refills: 0 | Status: DISCONTINUED | COMMUNITY
Start: 2022-05-26 | End: 2024-05-21

## 2024-05-21 RX ORDER — METRONIDAZOLE 500 MG/1
500 TABLET ORAL EVERY 8 HOURS
Qty: 21 | Refills: 0 | Status: DISCONTINUED | COMMUNITY
Start: 2023-07-18 | End: 2024-05-21

## 2024-05-21 NOTE — PLAN
[FreeTextEntry1] : HTN controlled check cmp and ua continue metoprolol 25mg 1/2 tab po bid  continue continue to monitor bp and f/u sooner if bp is rising  HLD check lipid and cmp continue rosuvastatin 10mg po qhs denies muscle pain   rhonci b/l cxr  advised if patient doesnt hear from me 1 week after testing to call office for results , patient agreed w/plan and understood.

## 2024-05-21 NOTE — PHYSICAL EXAM
[Normal] : the outer ears and nose were normal in appearance and the oropharynx was normal [No Lymphadenopathy] : no lymphadenopathy [Supple] : supple [No Respiratory Distress] : no respiratory distress  [No Accessory Muscle Use] : no accessory muscle use [Normal Rate] : normal rate  [Regular Rhythm] : with a regular rhythm [Normal S1, S2] : normal S1 and S2 [No Edema] : there was no peripheral edema [No Focal Deficits] : no focal deficits [Normal Affect] : the affect was normal [Normal Mood] : the mood was normal [Normal Insight/Judgement] : insight and judgment were intact [de-identified] : b/l justino [de-identified] : no calf tenderness b/l LE

## 2024-05-21 NOTE — PHYSICAL EXAM
[Normal] : the outer ears and nose were normal in appearance and the oropharynx was normal [No Lymphadenopathy] : no lymphadenopathy [Supple] : supple [No Respiratory Distress] : no respiratory distress  [No Accessory Muscle Use] : no accessory muscle use [Normal Rate] : normal rate  [Regular Rhythm] : with a regular rhythm [Normal S1, S2] : normal S1 and S2 [No Edema] : there was no peripheral edema [No Focal Deficits] : no focal deficits [Normal Affect] : the affect was normal [Normal Mood] : the mood was normal [Normal Insight/Judgement] : insight and judgment were intact [de-identified] : b/l justino [de-identified] : no calf tenderness b/l LE

## 2024-05-21 NOTE — HISTORY OF PRESENT ILLNESS
[de-identified] : 74yo M presents for f/ u for HTN and HLD. Denies chest pain, palpitations, shortness of breath, Headaches, vision changes or LE edema he is feeling well declined annual medicare wellness visit today

## 2024-05-21 NOTE — HISTORY OF PRESENT ILLNESS
[de-identified] : 72yo M presents for f/ u for HTN and HLD. Denies chest pain, palpitations, shortness of breath, Headaches, vision changes or LE edema he is feeling well declined annual medicare wellness visit today

## 2024-05-23 RX ORDER — LISINOPRIL 20 MG/1
20 TABLET ORAL
Qty: 30 | Refills: 0 | Status: ACTIVE | COMMUNITY
Start: 2024-05-23

## 2024-05-23 RX ORDER — AMLODIPINE BESYLATE 2.5 MG/1
2.5 TABLET ORAL DAILY
Qty: 90 | Refills: 0 | Status: ACTIVE | COMMUNITY
Start: 2024-05-23

## 2024-05-28 DIAGNOSIS — R82.90 UNSPECIFIED ABNORMAL FINDINGS IN URINE: ICD-10-CM

## 2024-06-18 ENCOUNTER — RX RENEWAL (OUTPATIENT)
Age: 74
End: 2024-06-18

## 2024-06-18 RX ORDER — ROSUVASTATIN CALCIUM 10 MG/1
10 TABLET, FILM COATED ORAL
Qty: 90 | Refills: 3 | Status: ACTIVE | COMMUNITY
Start: 2022-05-26 | End: 1900-01-01

## 2024-06-25 ENCOUNTER — RX RENEWAL (OUTPATIENT)
Age: 74
End: 2024-06-25

## 2024-06-25 RX ORDER — TAMSULOSIN HYDROCHLORIDE 0.4 MG/1
0.4 CAPSULE ORAL DAILY
Qty: 180 | Refills: 0 | Status: ACTIVE | COMMUNITY
Start: 2017-10-08 | End: 1900-01-01

## 2024-06-25 RX ORDER — OMEPRAZOLE 20 MG/1
20 CAPSULE, DELAYED RELEASE ORAL
Qty: 90 | Refills: 0 | Status: ACTIVE | COMMUNITY
Start: 2017-09-19 | End: 1900-01-01

## 2024-06-25 RX ORDER — METOPROLOL TARTRATE 25 MG/1
25 TABLET, FILM COATED ORAL
Qty: 30 | Refills: 0 | Status: ACTIVE | COMMUNITY
Start: 2017-10-08 | End: 1900-01-01

## 2024-11-08 ENCOUNTER — APPOINTMENT (OUTPATIENT)
Dept: FAMILY MEDICINE | Facility: CLINIC | Age: 74
End: 2024-11-08
Payer: MEDICARE

## 2024-11-08 VITALS
TEMPERATURE: 98.7 F | HEIGHT: 69 IN | HEART RATE: 75 BPM | RESPIRATION RATE: 16 BRPM | SYSTOLIC BLOOD PRESSURE: 110 MMHG | OXYGEN SATURATION: 96 % | WEIGHT: 221 LBS | DIASTOLIC BLOOD PRESSURE: 60 MMHG | BODY MASS INDEX: 32.73 KG/M2

## 2024-11-08 PROCEDURE — 99214 OFFICE O/P EST MOD 30 MIN: CPT

## 2024-11-08 RX ORDER — AZITHROMYCIN 250 MG/1
250 TABLET, FILM COATED ORAL
Qty: 1 | Refills: 0 | Status: ACTIVE | COMMUNITY
Start: 2024-11-08 | End: 1900-01-01

## 2024-11-08 RX ORDER — ALBUTEROL SULFATE 90 UG/1
108 (90 BASE) INHALANT RESPIRATORY (INHALATION)
Qty: 1 | Refills: 0 | Status: ACTIVE | COMMUNITY
Start: 2024-11-08 | End: 1900-01-01

## 2024-11-08 RX ORDER — BUDESONIDE AND FORMOTEROL FUMARATE DIHYDRATE 160; 4.5 UG/1; UG/1
160-4.5 AEROSOL RESPIRATORY (INHALATION) TWICE DAILY
Qty: 1 | Refills: 0 | Status: ACTIVE | COMMUNITY
Start: 2024-11-08 | End: 1900-01-01

## 2024-11-08 RX ORDER — METHYLPREDNISOLONE 4 MG/1
4 TABLET ORAL
Qty: 1 | Refills: 0 | Status: ACTIVE | COMMUNITY
Start: 2024-11-08 | End: 1900-01-01

## 2024-11-13 ENCOUNTER — APPOINTMENT (OUTPATIENT)
Dept: FAMILY MEDICINE | Facility: CLINIC | Age: 74
End: 2024-11-13
Payer: MEDICARE

## 2024-11-13 VITALS
SYSTOLIC BLOOD PRESSURE: 100 MMHG | BODY MASS INDEX: 32.73 KG/M2 | HEIGHT: 69 IN | RESPIRATION RATE: 15 BRPM | WEIGHT: 221 LBS | HEART RATE: 72 BPM | DIASTOLIC BLOOD PRESSURE: 68 MMHG | OXYGEN SATURATION: 97 %

## 2024-11-13 DIAGNOSIS — B96.89 OTHER SPECIFIED RESPIRATORY DISORDERS: ICD-10-CM

## 2024-11-13 DIAGNOSIS — R09.89 OTHER SPECIFIED SYMPTOMS AND SIGNS INVOLVING THE CIRCULATORY AND RESPIRATORY SYSTEMS: ICD-10-CM

## 2024-11-13 DIAGNOSIS — J98.8 OTHER SPECIFIED RESPIRATORY DISORDERS: ICD-10-CM

## 2024-11-13 DIAGNOSIS — J44.9 CHRONIC OBSTRUCTIVE PULMONARY DISEASE, UNSPECIFIED: ICD-10-CM

## 2024-11-13 PROCEDURE — G2211 COMPLEX E/M VISIT ADD ON: CPT

## 2024-11-13 PROCEDURE — 99214 OFFICE O/P EST MOD 30 MIN: CPT

## 2024-11-13 RX ORDER — PREDNISONE 20 MG/1
20 TABLET ORAL
Qty: 18 | Refills: 0 | Status: ACTIVE | COMMUNITY
Start: 2024-11-13 | End: 1900-01-01

## 2024-11-27 ENCOUNTER — APPOINTMENT (OUTPATIENT)
Dept: FAMILY MEDICINE | Facility: CLINIC | Age: 74
End: 2024-11-27
Payer: MEDICARE

## 2024-11-27 VITALS
WEIGHT: 224 LBS | OXYGEN SATURATION: 98 % | BODY MASS INDEX: 33.18 KG/M2 | HEART RATE: 57 BPM | DIASTOLIC BLOOD PRESSURE: 80 MMHG | SYSTOLIC BLOOD PRESSURE: 120 MMHG | HEIGHT: 69 IN | TEMPERATURE: 97.9 F

## 2024-11-27 DIAGNOSIS — J44.9 CHRONIC OBSTRUCTIVE PULMONARY DISEASE, UNSPECIFIED: ICD-10-CM

## 2024-11-27 DIAGNOSIS — Z23 ENCOUNTER FOR IMMUNIZATION: ICD-10-CM

## 2024-11-27 PROCEDURE — G2211 COMPLEX E/M VISIT ADD ON: CPT

## 2024-11-27 PROCEDURE — 99214 OFFICE O/P EST MOD 30 MIN: CPT

## 2024-11-27 PROCEDURE — 90662 IIV NO PRSV INCREASED AG IM: CPT

## 2024-11-27 PROCEDURE — G0008: CPT

## 2024-12-05 ENCOUNTER — RX RENEWAL (OUTPATIENT)
Age: 74
End: 2024-12-05

## 2024-12-20 ENCOUNTER — RX RENEWAL (OUTPATIENT)
Age: 74
End: 2024-12-20

## 2025-01-06 ENCOUNTER — APPOINTMENT (OUTPATIENT)
Dept: PULMONOLOGY | Facility: CLINIC | Age: 75
End: 2025-01-06
Payer: MEDICARE

## 2025-01-06 VITALS
TEMPERATURE: 97.4 F | BODY MASS INDEX: 33.18 KG/M2 | WEIGHT: 224 LBS | HEIGHT: 69 IN | HEART RATE: 69 BPM | DIASTOLIC BLOOD PRESSURE: 62 MMHG | OXYGEN SATURATION: 95 % | SYSTOLIC BLOOD PRESSURE: 124 MMHG

## 2025-01-06 DIAGNOSIS — J44.9 CHRONIC OBSTRUCTIVE PULMONARY DISEASE, UNSPECIFIED: ICD-10-CM

## 2025-01-06 DIAGNOSIS — J20.9 ACUTE BRONCHITIS, UNSPECIFIED: ICD-10-CM

## 2025-01-06 PROCEDURE — 94727 GAS DIL/WSHOT DETER LNG VOL: CPT

## 2025-01-06 PROCEDURE — 94729 DIFFUSING CAPACITY: CPT

## 2025-01-06 PROCEDURE — 94010 BREATHING CAPACITY TEST: CPT

## 2025-01-06 PROCEDURE — 99204 OFFICE O/P NEW MOD 45 MIN: CPT | Mod: 25

## 2025-01-06 PROCEDURE — ZZZZZ: CPT

## 2025-01-06 RX ORDER — ALBUTEROL SULFATE 2.5 MG/3ML
(2.5 MG/3ML) SOLUTION RESPIRATORY (INHALATION)
Qty: 0 | Refills: 0 | Status: COMPLETED | COMMUNITY
Start: 2025-01-06

## 2025-01-06 RX ORDER — BUDESONIDE AND FORMOTEROL FUMARATE DIHYDRATE 160; 4.5 UG/1; UG/1
160-4.5 AEROSOL RESPIRATORY (INHALATION) TWICE DAILY
Qty: 1 | Refills: 3 | Status: ACTIVE | COMMUNITY
Start: 2025-01-06 | End: 1900-01-01

## 2025-02-06 ENCOUNTER — APPOINTMENT (OUTPATIENT)
Dept: PULMONOLOGY | Facility: CLINIC | Age: 75
End: 2025-02-06
Payer: MEDICARE

## 2025-02-06 VITALS
BODY MASS INDEX: 32.58 KG/M2 | HEART RATE: 65 BPM | HEIGHT: 69 IN | SYSTOLIC BLOOD PRESSURE: 124 MMHG | DIASTOLIC BLOOD PRESSURE: 78 MMHG | WEIGHT: 220 LBS | OXYGEN SATURATION: 97 % | TEMPERATURE: 97.1 F

## 2025-02-06 DIAGNOSIS — J32.9 CHRONIC SINUSITIS, UNSPECIFIED: ICD-10-CM

## 2025-02-06 DIAGNOSIS — J44.9 CHRONIC OBSTRUCTIVE PULMONARY DISEASE, UNSPECIFIED: ICD-10-CM

## 2025-02-06 PROCEDURE — 99214 OFFICE O/P EST MOD 30 MIN: CPT

## 2025-02-06 RX ORDER — IPRATROPIUM BROMIDE 21 UG/1
0.03 SPRAY NASAL TWICE DAILY
Qty: 1 | Refills: 6 | Status: ACTIVE | COMMUNITY
Start: 2025-02-06 | End: 1900-01-01

## 2025-02-20 ENCOUNTER — APPOINTMENT (OUTPATIENT)
Dept: FAMILY MEDICINE | Facility: CLINIC | Age: 75
End: 2025-02-20
Payer: MEDICARE

## 2025-02-20 VITALS
WEIGHT: 226.25 LBS | DIASTOLIC BLOOD PRESSURE: 70 MMHG | OXYGEN SATURATION: 97 % | SYSTOLIC BLOOD PRESSURE: 120 MMHG | HEART RATE: 85 BPM | BODY MASS INDEX: 33.51 KG/M2 | TEMPERATURE: 97.7 F | HEIGHT: 69 IN

## 2025-02-20 VITALS — SYSTOLIC BLOOD PRESSURE: 140 MMHG | DIASTOLIC BLOOD PRESSURE: 80 MMHG

## 2025-02-20 VITALS — SYSTOLIC BLOOD PRESSURE: 136 MMHG | DIASTOLIC BLOOD PRESSURE: 70 MMHG

## 2025-02-20 DIAGNOSIS — Z00.00 ENCOUNTER FOR GENERAL ADULT MEDICAL EXAMINATION W/OUT ABNORMAL FINDINGS: ICD-10-CM

## 2025-02-20 DIAGNOSIS — K46.9 UNSPECIFIED ABDOMINAL HERNIA W/OUT OBSTRUCTION OR GANGRENE: ICD-10-CM

## 2025-02-20 DIAGNOSIS — I10 ESSENTIAL (PRIMARY) HYPERTENSION: ICD-10-CM

## 2025-02-20 DIAGNOSIS — E78.5 HYPERLIPIDEMIA, UNSPECIFIED: ICD-10-CM

## 2025-02-20 DIAGNOSIS — I71.9 AORTIC ANEURYSM OF UNSPECIFIED SITE, W/OUT RUPTURE: ICD-10-CM

## 2025-02-20 PROCEDURE — G0439: CPT

## 2025-02-27 DIAGNOSIS — R80.9 PROTEINURIA, UNSPECIFIED: ICD-10-CM

## 2025-03-12 ENCOUNTER — RX RENEWAL (OUTPATIENT)
Age: 75
End: 2025-03-12

## 2025-03-18 ENCOUNTER — RX RENEWAL (OUTPATIENT)
Age: 75
End: 2025-03-18

## 2025-05-20 ENCOUNTER — APPOINTMENT (OUTPATIENT)
Dept: FAMILY MEDICINE | Facility: CLINIC | Age: 75
End: 2025-05-20
Payer: MEDICARE

## 2025-05-20 VITALS
HEIGHT: 69 IN | BODY MASS INDEX: 33.47 KG/M2 | RESPIRATION RATE: 15 BRPM | DIASTOLIC BLOOD PRESSURE: 62 MMHG | SYSTOLIC BLOOD PRESSURE: 102 MMHG | OXYGEN SATURATION: 97 % | HEART RATE: 60 BPM | WEIGHT: 226 LBS

## 2025-05-20 VITALS — SYSTOLIC BLOOD PRESSURE: 108 MMHG | DIASTOLIC BLOOD PRESSURE: 60 MMHG

## 2025-05-20 DIAGNOSIS — E78.5 HYPERLIPIDEMIA, UNSPECIFIED: ICD-10-CM

## 2025-05-20 DIAGNOSIS — I10 ESSENTIAL (PRIMARY) HYPERTENSION: ICD-10-CM

## 2025-05-20 DIAGNOSIS — J44.9 CHRONIC OBSTRUCTIVE PULMONARY DISEASE, UNSPECIFIED: ICD-10-CM

## 2025-05-20 DIAGNOSIS — M79.10 MYALGIA, UNSPECIFIED SITE: ICD-10-CM

## 2025-05-20 DIAGNOSIS — R80.9 PROTEINURIA, UNSPECIFIED: ICD-10-CM

## 2025-05-20 DIAGNOSIS — E11.9 TYPE 2 DIABETES MELLITUS W/OUT COMPLICATIONS: ICD-10-CM

## 2025-05-20 PROCEDURE — G2211 COMPLEX E/M VISIT ADD ON: CPT

## 2025-05-20 PROCEDURE — 99214 OFFICE O/P EST MOD 30 MIN: CPT

## 2025-06-04 ENCOUNTER — OFFICE (OUTPATIENT)
Dept: URBAN - METROPOLITAN AREA CLINIC 103 | Facility: CLINIC | Age: 75
Setting detail: OPHTHALMOLOGY
End: 2025-06-04
Payer: MEDICARE

## 2025-06-04 DIAGNOSIS — H35.033: ICD-10-CM

## 2025-06-04 DIAGNOSIS — H25.12: ICD-10-CM

## 2025-06-04 DIAGNOSIS — H26.491: ICD-10-CM

## 2025-06-04 DIAGNOSIS — H16.223: ICD-10-CM

## 2025-06-04 PROCEDURE — 92014 COMPRE OPH EXAM EST PT 1/>: CPT | Performed by: OPHTHALMOLOGY

## 2025-06-04 PROCEDURE — 92250 FUNDUS PHOTOGRAPHY W/I&R: CPT | Performed by: OPHTHALMOLOGY

## 2025-06-04 ASSESSMENT — REFRACTION_MANIFEST
OS_VA2: 20/20
OS_VA2: 20/20
OS_VA1: 20/20-2
OS_ADD: +2.50
OS_AXIS: 090
OD_SPHERE: PL
OS_CYLINDER: -0.50
OD_SPHERE: -3.50
OD_CYLINDER: SPH
OD_VA1: 20/40
OS_ADD: +2.50
OD_CYLINDER: -1.00
OS_SPHERE: PL
OD_VA2: 20/20
OS_SPHERE: +0.25
OD_VA2: 20/20
OU_VA: 20/20-2
OD_AXIS: 090
OD_ADD: +2.50
OD_ADD: +2.50
OS_CYLINDER: SPH

## 2025-06-04 ASSESSMENT — REFRACTION_CURRENTRX
OD_OVR_VA: 20/
OS_SPHERE: +0.25
OD_SPHERE: -2.00
OS_CYLINDER: -0.50
OD_SPHERE: +2.25
OS_VPRISM_DIRECTION: BF
OS_SPHERE: +2.25
OD_OVR_VA: 20/
OD_ADD: +2.50
OS_AXIS: 92
OD_VPRISM_DIRECTION: BF
OS_OVR_VA: 20/
OD_AXIS: 92
OS_ADD: +2.50
OS_OVR_VA: 20/
OD_CYLINDER: -0.75

## 2025-06-04 ASSESSMENT — SUPERFICIAL PUNCTATE KERATITIS (SPK)
OD_SPK: T
OS_SPK: T

## 2025-06-04 ASSESSMENT — VISUAL ACUITY
OS_BCVA: 20/20
OD_BCVA: 20/30

## 2025-06-04 ASSESSMENT — REFRACTION_AUTOREFRACTION
OD_SPHERE: +0.25
OS_CYLINDER: -0.50
OD_AXIS: 064
OS_AXIS: 116
OS_SPHERE: -0.25
OD_CYLINDER: -0.50

## 2025-06-04 ASSESSMENT — KERATOMETRY
OS_K2POWER_DIOPTERS: 44.50
OD_K1POWER_DIOPTERS: 44.25
OD_K2POWER_DIOPTERS: 44.75
OS_AXISANGLE_DEGREES: 064
OD_AXISANGLE_DEGREES: 115
OS_K1POWER_DIOPTERS: 43.75

## 2025-06-04 ASSESSMENT — CONFRONTATIONAL VISUAL FIELD TEST (CVF)
OD_FINDINGS: FULL
OS_FINDINGS: FULL

## 2025-06-04 ASSESSMENT — TONOMETRY
OS_IOP_MMHG: 14
OD_IOP_MMHG: 14

## 2025-06-05 ENCOUNTER — NON-APPOINTMENT (OUTPATIENT)
Age: 75
End: 2025-06-05

## 2025-06-05 ENCOUNTER — APPOINTMENT (OUTPATIENT)
Dept: PULMONOLOGY | Facility: CLINIC | Age: 75
End: 2025-06-05
Payer: MEDICARE

## 2025-06-05 VITALS
HEART RATE: 66 BPM | OXYGEN SATURATION: 95 % | TEMPERATURE: 98 F | DIASTOLIC BLOOD PRESSURE: 68 MMHG | WEIGHT: 220 LBS | HEIGHT: 69 IN | SYSTOLIC BLOOD PRESSURE: 140 MMHG | BODY MASS INDEX: 32.58 KG/M2

## 2025-06-05 DIAGNOSIS — J44.9 CHRONIC OBSTRUCTIVE PULMONARY DISEASE, UNSPECIFIED: ICD-10-CM

## 2025-06-05 PROCEDURE — 99214 OFFICE O/P EST MOD 30 MIN: CPT

## 2025-06-12 ENCOUNTER — RX RENEWAL (OUTPATIENT)
Age: 75
End: 2025-06-12

## 2025-06-19 PROBLEM — E87.5 HYPERKALEMIA: Status: ACTIVE | Noted: 2025-06-19

## 2025-06-19 PROBLEM — Z86.39 HISTORY OF HYPERKALEMIA: Status: RESOLVED | Noted: 2021-08-10 | Resolved: 2025-06-19

## 2025-07-23 ENCOUNTER — RX RENEWAL (OUTPATIENT)
Age: 75
End: 2025-07-23

## 2025-08-13 ENCOUNTER — RX RENEWAL (OUTPATIENT)
Age: 75
End: 2025-08-13

## 2025-09-09 LAB — HBA1C MFR BLD HPLC: 6.5

## 2025-09-11 ENCOUNTER — RX RENEWAL (OUTPATIENT)
Age: 75
End: 2025-09-11

## 2025-09-12 ENCOUNTER — APPOINTMENT (OUTPATIENT)
Dept: PULMONOLOGY | Facility: CLINIC | Age: 75
End: 2025-09-12